# Patient Record
Sex: MALE | Race: BLACK OR AFRICAN AMERICAN | NOT HISPANIC OR LATINO | Employment: UNEMPLOYED | URBAN - METROPOLITAN AREA
[De-identification: names, ages, dates, MRNs, and addresses within clinical notes are randomized per-mention and may not be internally consistent; named-entity substitution may affect disease eponyms.]

---

## 2017-07-23 ENCOUNTER — TRANSFERRED RECORDS (OUTPATIENT)
Dept: HEALTH INFORMATION MANAGEMENT | Facility: CLINIC | Age: 47
End: 2017-07-23

## 2017-07-27 ENCOUNTER — HOSPITAL ENCOUNTER (EMERGENCY)
Facility: CLINIC | Age: 47
Discharge: HOME | End: 2017-07-27
Attending: EMERGENCY MEDICINE | Admitting: EMERGENCY MEDICINE
Payer: COMMERCIAL

## 2017-07-27 ENCOUNTER — APPOINTMENT (OUTPATIENT)
Dept: GENERAL RADIOLOGY | Facility: CLINIC | Age: 47
End: 2017-07-27
Attending: EMERGENCY MEDICINE
Payer: COMMERCIAL

## 2017-07-27 VITALS
WEIGHT: 210 LBS | RESPIRATION RATE: 18 BRPM | OXYGEN SATURATION: 98 % | TEMPERATURE: 98.7 F | SYSTOLIC BLOOD PRESSURE: 125 MMHG | HEART RATE: 86 BPM | DIASTOLIC BLOOD PRESSURE: 98 MMHG

## 2017-07-27 DIAGNOSIS — M25.511 ACUTE PAIN OF RIGHT SHOULDER: ICD-10-CM

## 2017-07-27 PROCEDURE — 73030 X-RAY EXAM OF SHOULDER: CPT | Mod: RT

## 2017-07-27 PROCEDURE — 99284 EMERGENCY DEPT VISIT MOD MDM: CPT | Mod: 25

## 2017-07-27 PROCEDURE — 99284 EMERGENCY DEPT VISIT MOD MDM: CPT | Mod: Z6 | Performed by: EMERGENCY MEDICINE

## 2017-07-27 PROCEDURE — 71020 XR CHEST 2 VW: CPT

## 2017-07-27 RX ORDER — NAPROXEN 500 MG/1
500 TABLET ORAL 2 TIMES DAILY WITH MEALS
Qty: 16 TABLET | Refills: 0 | Status: SHIPPED | OUTPATIENT
Start: 2017-07-27 | End: 2017-08-04

## 2017-07-27 RX ORDER — OXYCODONE AND ACETAMINOPHEN 5; 325 MG/1; MG/1
1-2 TABLET ORAL EVERY 4 HOURS PRN
Qty: 15 TABLET | Refills: 0 | Status: SHIPPED | OUTPATIENT
Start: 2017-07-27 | End: 2024-07-01

## 2017-07-27 ASSESSMENT — ENCOUNTER SYMPTOMS
AGITATION: 0
VOMITING: 0
WEAKNESS: 1
POLYDIPSIA: 0
LIGHT-HEADEDNESS: 0
HEADACHES: 0
SHORTNESS OF BREATH: 0
DIFFICULTY URINATING: 0
ABDOMINAL PAIN: 0
NUMBNESS: 0
NECK STIFFNESS: 0
COLOR CHANGE: 0
CHILLS: 0
NAUSEA: 0
FEVER: 0
ARTHRALGIAS: 1
NECK PAIN: 0
BRUISES/BLEEDS EASILY: 0
JOINT SWELLING: 1

## 2017-07-27 NOTE — ED AVS SNAPSHOT
[facility], Emergency Department    [address]    [place] [address]    Phone:  [phone]    Fax:  [phone]                                       Ehsan Rogers   MRN: [id number]    Department:  [facility], Emergency Department   Date of Visit:  7/27/2017           Patient Information     Date Of Birth          1970        Your diagnoses for this visit were:     Acute pain of right shoulder        You were seen by Darrin Sarmiento MD.        Discharge Instructions       Please Continue wearing her sling that you were provided several days ago during her emergency department visit. You should be hearing from [facility]opedic Clinic(phone: [phone]) in 3-5 days in regards to establishing a follow-up appointment for you.    Discharge References/Attachments     AC JOINT SPRAIN (ADULT) (ENGLISH)    SHOULDER SEPARATION, TREATMENT FOR  (ENGLISH)      24 Hour Appointment Hotline       To make an appointment at any [facility], call [phone] ([phone]). If you don't have a family doctor or clinic, we will help you find one. [facility] are conveniently located to serve the needs of you and your family.          ED Discharge Orders     ORTHO  REFERRAL       [facility] is referring you to the Orthopedic  Services at [facility].       The  Representative will assist you in the coordination of your Orthopedic and Musculoskeletal Care as prescribed by your physician.    The  Representative will call you within 1 business day to help schedule your appointment, or you may contact the  Representative at:    All areas ~ [phone]     Type of Referral : Surgical / Specialist       Timeframe requested: 3 - 5 days    Coverage of these services is subject to the terms and limitations of your health insurance plan.  Please call member services at your health plan with any benefit or coverage  questions.      If X-rays, CT or MRI's have been performed, please contact the facility where they were done to arrange for , prior to your scheduled appointment.  Please bring this referral request to your appointment and present it to your specialist.                     Review of your medicines      START taking        Dose / Directions Last dose taken    naproxen 500 MG tablet   Commonly known as:  NAPROSYN   Dose:  500 mg   Quantity:  16 tablet        Take 1 tablet (500 mg) by mouth 2 times daily (with meals) for 8 days   Refills:  0        oxyCODONE-acetaminophen 5-325 MG per tablet   Commonly known as:  PERCOCET   Dose:  1-2 tablet   Quantity:  15 tablet        Take 1-2 tablets by mouth every 4 hours as needed for pain   Refills:  0          Our records show that you are taking the medicines listed below. If these are incorrect, please call your family doctor or clinic.        Dose / Directions Last dose taken    ADVIL PO        Refills:  0        CYMBALTA PO   Dose:  20 mg        Take 20 mg by mouth daily   Refills:  0        LEXAPRO 20 MG tablet   Dose:  20 mg   Generic drug:  escitalopram        Take 20 mg by mouth daily.   Refills:  0        SEROQUEL 25 MG tablet   Dose:  25 mg   Generic drug:  QUEtiapine        Take 25 mg by mouth At Bedtime.   Refills:  0        traZODone 100 MG tablet   Commonly known as:  DESYREL   Dose:  100 mg        Take 100 mg by mouth At Bedtime.   Refills:  0        WELLBUTRIN XL PO   Dose:  150 mg        Take 150 mg by mouth daily   Refills:  0                Prescriptions were sent or printed at these locations (2 Prescriptions)                   Other Prescriptions                Printed at Department/Unit printer (2 of 2)         naproxen (NAPROSYN) 500 MG tablet               oxyCODONE-acetaminophen (PERCOCET) 5-325 MG per tablet                Procedures and tests performed during your visit     Shoulder XR, G/E 3 views, right    XR Chest 2 Views      Orders Needing  "Specimen Collection     None      Pending Results     No orders found from 2017 to 2017.            Pending Culture Results     No orders found from 2017 to 2017.            Pending Results Instructions     If you had any lab results that were not finalized at the time of your Discharge, you can call the ED Lab Result RN at [phone]. You will be contacted by this team for any positive Lab results or changes in treatment. The nurses are available 7 days a week from 10A to 6:30P.  You can leave a message 24 hours per day and they will return your call.        Thank you for choosing [facility]       Thank you for choosing [facility] for your care. Our goal is always to provide you with excellent care. Hearing back from our patients is one way we can continue to improve our services. Please take a few minutes to complete the written survey that you may receive in the mail after you visit with us. Thank you!        enosiXhart Information     [employer]lete lets you send messages to your doctor, view your test results, renew your prescriptions, schedule appointments and more. To sign up, go to [email or web address] . Click on \"Log in\" on the left side of the screen, which will take you to the Welcome page. Then click on \"Sign up Now\" on the right side of the page.     You will be asked to enter the access code listed below, as well as some personal information. Please follow the directions to create your username and password.     Your access code is: [email or web address]  Expires: 10/25/2017  7:17 PM     Your access code will  in 90 days. If you need help or a new code, please call your [facility] clinic or [phone].        Care EveryWhere ID     This is your Care EveryWhere ID. This could be used by other organizations to access your [facility] medical records  [id number]        Equal Access to Services     VINCENZO MALDONADO AH: Missy Dove, silvana rosen, dejon guerra " elda vasquez. So [facility] [phone].    ATENCIÓN: Si habla español, tiene a porter disposición servicios gratuitos de asistencia lingüística. Llame al [phone].    We comply with applicable federal civil rights laws and [place] laws. We do not discriminate on the basis of race, color, national origin, age, disability sex, sexual orientation or gender identity.            After Visit Summary       This is your record. Keep this with you and show to your community pharmacist(s) and doctor(s) at your next visit.

## 2017-07-27 NOTE — ED AVS SNAPSHOT
[facility] [place], Emergency Department    [address]    [facility] [address]    Phone:  [phone]    Fax:  [phone]                                       Ehsan Rogers   MRN: [id number]    Department:  [facility], Emergency Department   Date of Visit:  7/27/2017           After Visit Summary Signature Page     I have received my discharge instructions, and my questions have been answered. I have discussed any challenges I see with this plan with the nurse or doctor.    ..........................................................................................................................................  Patient/Patient Representative Signature      ..........................................................................................................................................  Patient Representative Print Name and Relationship to Patient    ..................................................               ................................................  Date                                            Time    ..........................................................................................................................................  Reviewed by Signature/Title    ...................................................              ..............................................  Date                                                            Time
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
## 2017-07-28 NOTE — DISCHARGE INSTRUCTIONS
Please Continue wearing her sling that you were provided several days ago during her emergency department visit. You should be hearing from [facility](phone: [phone]) in 3-5 days in regards to establishing a follow-up appointment for you.

## 2017-08-04 ENCOUNTER — PRE VISIT (OUTPATIENT)
Dept: ORTHOPEDICS | Facility: CLINIC | Age: 47
End: 2017-08-04

## 2017-08-04 NOTE — TELEPHONE ENCOUNTER
Records received from [facility].   Included:  ED notes: 7/23/17  Radiology reports: xray right shoulder 7/23/17

## 2017-08-04 NOTE — TELEPHONE ENCOUNTER
1.  Date/reason for appt: 8/14/17 2:10PM - Right Shoulder Acute Injury/ Ligament Tear  2.  Referring provider: Hosp f/u  3.  Call to patient (Yes / No - short description): yes - LVM for pt. Need LIBERTAD form [facility]  4.  Previous care at / records requested from:   - [facility] -- Pt seen in ER here, faxed request in attempt for records   - [facility] ED/Hosp 7/27/17 note in Epic, imaging in pacs

## 2017-08-14 ENCOUNTER — OFFICE VISIT (OUTPATIENT)
Dept: ORTHOPEDICS | Facility: CLINIC | Age: 47
End: 2017-08-14

## 2017-08-14 VITALS — BODY MASS INDEX: 28.02 KG/M2 | HEIGHT: 74 IN | WEIGHT: 218.3 LBS

## 2017-08-14 DIAGNOSIS — G89.29 CHRONIC SHOULDER PAIN, UNSPECIFIED LATERALITY: Primary | ICD-10-CM

## 2017-08-14 DIAGNOSIS — M25.519 CHRONIC SHOULDER PAIN, UNSPECIFIED LATERALITY: Primary | ICD-10-CM
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
## 2017-08-14 NOTE — MR AVS SNAPSHOT
"              After Visit Summary   2017    Ehsan Rogers    MRN: [id number]           Patient Information     Date Of Birth          1970        Visit Information        Provider Department      2017 2:10 PM Rod Bean MD [facility]        Today's Diagnoses     Chronic shoulder pain, unspecified laterality    -  1       Follow-ups after your visit        Who to contact     Please call your clinic at [phone] to:    Ask questions about your health    Make or cancel appointments    Discuss your medicines    Learn about your test results    Speak to your doctor   If you have compliments or concerns about an experience at your clinic, or if you wish to file a complaint, please contact [facility] Patient Relations at [phone] or email us at [email or web address]         Additional Information About Your Visit        MyChart Information     Continuent is an electronic gateway that provides easy, online access to your medical records. With Continuent, you can request a clinic appointment, read your test results, renew a prescription or communicate with your care team.     To sign up for [employer] visit the website at [email or web address]   You will be asked to enter the access code listed below, as well as some personal information. Please follow the directions to create your username and password.     Your access code is: [email or web address]  Expires: 10/25/2017  7:17 PM     Your access code will  in 90 days. If you need help or a new code, please contact your [facility] or call [phone] for assistance.        Care EveryWhere ID     This is your Care EveryWhere ID. This could be used by other organizations to access your [facility] medical records  [id number]        Your Vitals Were     Height BMI (Body Mass Index)                1.88 m (6' 2\") 28.03 kg/m2           Blood Pressure from Last 3 " Encounters:   07/27/17 (!) 125/98   05/11/12 126/86    Weight from Last 3 Encounters:   08/14/17 99 kg (218 lb 4.8 oz)   07/27/17 95.3 kg (210 lb)   05/11/12 93.4 kg (206 lb)                 Today's Medication Changes          These changes are accurate as of: 8/14/17 11:59 PM.  If you have any questions, ask your nurse or doctor.               Stop taking these medicines if you haven't already. Please contact your care team if you have questions.     SEROQUEL 25 MG tablet   Generic drug:  QUEtiapine           WELLBUTRIN XL PO                    Primary Care Provider    Physician No Ref-Primary       No address on file        Equal Access to Services     VINCENZO MALDONADO : Missy Dove, silvana rosen, karma vidales, dejon vasquez. So [facility] [phone].    ATENCIÓN: Si habla español, tiene a porter disposición servicios gratuitos de asistencia lingüística. Llame al [phone].    We comply with applicable federal civil rights laws and [place] laws. We do not discriminate on the basis of race, color, national origin, age, disability sex, sexual orientation or gender identity.            Thank you!     Thank you for choosing [facility]  for your care. Our goal is always to provide you with excellent care. Hearing back from our patients is one way we can continue to improve our services. Please take a few minutes to complete the written survey that you may receive in the mail after your visit with us. Thank you!             Your Updated Medication List - Protect others around you: Learn how to safely use, store and throw away your medicines at www.disposemymeds.org.          This list is accurate as of: 8/14/17 11:59 PM.  Always use your most recent med list.                   Brand Name Dispense Instructions for use Diagnosis    ADVIL PO           CYMBALTA PO      Take 20 mg by mouth daily        LEXAPRO 20 MG tablet   Generic drug:  escitalopram       Take 20 mg by mouth daily.        oxyCODONE-acetaminophen 5-325 MG per tablet    PERCOCET    15 tablet    Take 1-2 tablets by mouth every 4 hours as needed for pain        traZODone 100 MG tablet    DESYREL     Take 100 mg by mouth At Bedtime.
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
## 2017-08-15 ENCOUNTER — DOCUMENTATION ONLY (OUTPATIENT)
Dept: ORTHOPEDICS | Facility: CLINIC | Age: 47
End: 2017-08-15
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
## 2017-08-30 ENCOUNTER — TELEPHONE (OUTPATIENT)
Dept: ORTHOPEDICS | Facility: CLINIC | Age: 47
End: 2017-08-30

## 2017-08-30 NOTE — TELEPHONE ENCOUNTER
Bilateral shoulder MRIs done at [facility] 08/19/2017.  Message left on voicemail to call the clinic.  He should schedule a return appointment with Dr Bean to discuss results.

## 2017-09-25 ENCOUNTER — OFFICE VISIT (OUTPATIENT)
Dept: ORTHOPEDICS | Facility: CLINIC | Age: 47
End: 2017-09-25

## 2017-09-25 VITALS — WEIGHT: 215 LBS | BODY MASS INDEX: 27.59 KG/M2 | HEIGHT: 74 IN

## 2017-09-25 DIAGNOSIS — M75.52 SUBACROMIAL BURSITIS OF LEFT SHOULDER JOINT: ICD-10-CM

## 2017-09-25 DIAGNOSIS — S43.101D AC SEPARATION, RIGHT, SUBSEQUENT ENCOUNTER: Primary | ICD-10-CM

## 2017-09-25 NOTE — Clinical Note
9/25/2017       RE: Ehsan Rogers  [address] [address]     Dear Colleague,    Thank you for referring your patient, Ehsan Rogers, to the [facility] at [facility]. Please see a copy of my visit note below.    No notes on file    Again, thank you for allowing me to participate in the care of your patient.      Sincerely,    Rod Bean MD
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
## 2017-09-25 NOTE — MR AVS SNAPSHOT
After Visit Summary   2017    Ehsan Rogers    MRN: [id number]           Patient Information     Date Of Birth          1970        Visit Information        Provider Department      2017 8:40 AM Rod Bean MD [facility]        Today's Diagnoses     AC separation, right, subsequent encounter    -  1    Subacromial bursitis of left shoulder joint           Follow-ups after your visit        Additional Services     [facility] REFERRAL       Cuff, Deltoid and parascapular strengthening and range of motion.    Teach and supervise home program.    Progress as tolerated to strengthening.    Modalities PRN. Bilateral shoulder treatment.                  Who to contact     Please call your clinic at [phone] to:    Ask questions about your health    Make or cancel appointments    Discuss your medicines    Learn about your test results    Speak to your doctor   If you have compliments or concerns about an experience at your clinic, or if you wish to file a complaint, please contact [facility] Patient Relations at [phone] or email us at [email or web address]         Additional Information About Your Visit        MyChart Information     [employer] is an electronic gateway that provides easy, online access to your medical records. With [employer], you can request a clinic appointment, read your test results, renew a prescription or communicate with your care team.     To sign up for [employer] visit the website at [email or web address]   You will be asked to enter the access code listed below, as well as some personal information. Please follow the directions to create your username and password.     Your access code is: [email or web address]  Expires: 10/25/2017  7:17 PM     Your access code will  in 90 days. If you need help or a new code, please contact your [facility] or  "call [phone] for assistance.        Care EveryWhere ID     This is your Care EveryWhere ID. This could be used by other organizations to access your [facility] medical records  [id number]        Your Vitals Were     Height BMI (Body Mass Index)                1.88 m (6' 2\") 27.6 kg/m2           Blood Pressure from Last 3 Encounters:   07/27/17 (!) 125/98   05/11/12 126/86    Weight from Last 3 Encounters:   09/25/17 97.5 kg (215 lb)   08/14/17 99 kg (218 lb 4.8 oz)   07/27/17 95.3 kg (210 lb)              We Performed the Following     [facility], [facility]RACT[facility] REFERRAL        Primary Care Provider    Physician No Ref-Primary       No address on file        Equal Access to Services     VINCENZO MALDONADO : Missy Dove, waaxda luqadaha, qaybta kaalmada adearlenyaessence, dejon cloud . So [facility] [phone].    ATENCIÓN: Si habla español, tiene a porter disposición servicios gratuitos de asistencia lingüística. Llame al [phone].    We comply with applicable federal civil rights laws and [place] laws. We do not discriminate on the basis of race, color, national origin, age, disability sex, sexual orientation or gender identity.            Thank you!     Thank you for choosing [facility]  for your care. Our goal is always to provide you with excellent care. Hearing back from our patients is one way we can continue to improve our services. Please take a few minutes to complete the written survey that you may receive in the mail after your visit with us. Thank you!             Your Updated Medication List - Protect others around you: Learn how to safely use, store and throw away your medicines at www.disposemymeds.org.          This list is accurate as of: 9/25/17  9:42 AM.  Always use your most recent med list.                   Brand Name Dispense Instructions for use Diagnosis    ADVIL PO           CYMBALTA PO      Take 20 mg by mouth daily        LEXAPRO 20 " MG tablet   Generic drug:  escitalopram      Take 20 mg by mouth daily.        oxyCODONE-acetaminophen 5-325 MG per tablet    PERCOCET    15 tablet    Take 1-2 tablets by mouth every 4 hours as needed for pain        traZODone 100 MG tablet    DESYREL     Take 100 mg by mouth At Bedtime.
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
## 2017-10-30 ENCOUNTER — TELEPHONE (OUTPATIENT)
Dept: ORTHOPEDICS | Facility: CLINIC | Age: 47
End: 2017-10-30

## 2019-11-06 ENCOUNTER — HEALTH MAINTENANCE LETTER (OUTPATIENT)
Age: 49
End: 2019-11-06

## 2019-12-31 ENCOUNTER — COMMUNICATION - HEALTHEAST (OUTPATIENT)
Dept: TELEHEALTH | Facility: CLINIC | Age: 49
End: 2019-12-31

## 2019-12-31 ENCOUNTER — OFFICE VISIT - HEALTHEAST (OUTPATIENT)
Dept: FAMILY MEDICINE | Facility: CLINIC | Age: 49
End: 2019-12-31

## 2019-12-31 DIAGNOSIS — Z00.00 ROUTINE ADULT HEALTH MAINTENANCE: ICD-10-CM

## 2019-12-31 LAB
CHOLEST SERPL-MCNC: 197 MG/DL
FASTING STATUS PATIENT QL REPORTED: NO
HDLC SERPL-MCNC: 48 MG/DL
LDLC SERPL CALC-MCNC: 125 MG/DL
PSA SERPL-MCNC: 0.2 NG/ML (ref 0–3.5)
TRIGL SERPL-MCNC: 120 MG/DL
TSH SERPL DL<=0.005 MIU/L-ACNC: 1.85 UIU/ML (ref 0.3–5)

## 2019-12-31 ASSESSMENT — MIFFLIN-ST. JEOR: SCORE: 1944.32

## 2019-12-31 ASSESSMENT — PATIENT HEALTH QUESTIONNAIRE - PHQ9: SUM OF ALL RESPONSES TO PHQ QUESTIONS 1-9: 14

## 2020-01-01 LAB
HBV SURFACE AG SERPL QL IA: NEGATIVE
HCV AB SERPL QL IA: NEGATIVE
HEPATITIS B SURFACE ANTIBODY LHE- HISTORICAL: ABNORMAL

## 2020-01-02 ENCOUNTER — COMMUNICATION - HEALTHEAST (OUTPATIENT)
Dept: FAMILY MEDICINE | Facility: CLINIC | Age: 50
End: 2020-01-02

## 2020-11-29 ENCOUNTER — HEALTH MAINTENANCE LETTER (OUTPATIENT)
Age: 50
End: 2020-11-29

## 2021-05-25 ENCOUNTER — RECORDS - HEALTHEAST (OUTPATIENT)
Dept: ADMINISTRATIVE | Facility: CLINIC | Age: 51
End: 2021-05-25

## 2021-05-26 ASSESSMENT — PATIENT HEALTH QUESTIONNAIRE - PHQ9: SUM OF ALL RESPONSES TO PHQ QUESTIONS 1-9: 14

## 2021-06-04 VITALS
DIASTOLIC BLOOD PRESSURE: 68 MMHG | WEIGHT: 229.38 LBS | OXYGEN SATURATION: 94 % | HEART RATE: 71 BPM | SYSTOLIC BLOOD PRESSURE: 100 MMHG | HEIGHT: 73 IN | RESPIRATION RATE: 16 BRPM | BODY MASS INDEX: 30.4 KG/M2

## 2021-06-04 NOTE — PROGRESS NOTES
Please inform patient that his laboratory results look good.  His immunization for hepatitis B may not be completely in effect at this point.  It may have worn off.  If he would like to repeat the vaccination at some point we would be happy to do so.
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
## 2021-06-20 NOTE — LETTER
Letter by Linnea Hernandez MD at      Author: Linnea Hernandez MD Service: -- Author Type: --    Filed:  Encounter Date: 1/2/2020 Status: Signed         Ehsan Rogers  [address] [address]            January 2, 2020        Dear Mr. Rogers,        Below are the results from your recent visit:    Resulted Orders   Thyroid Stimulating Hormone (TSH)   Result Value Ref Range    TSH 1.85 0.30 - 5.00 uIU/mL   PSA (Prostatic-Specific Antigen), Annual Screen   Result Value Ref Range    PSA 0.2 0.0 - 3.5 ng/mL    Narrative    Method is Abbott Prostate-Specific Antigen (PSA)  Standard-WHO 1st International (90:10)   Lipid [place]   Result Value Ref Range    Cholesterol 197 <=199 mg/dL    Triglycerides 120 <=149 mg/dL    HDL Cholesterol 48 >=40 mg/dL    LDL Calculated 125 <=129 mg/dL    Patient Fasting > 8hrs? No    Hepatitis C Antibody (Anti-HCV)   Result Value Ref Range    Hepatitis C Ab Negative Negative   Hepatitis B Surface Antibody (Anti-HBs)   Result Value Ref Range    Hep B Surface Antibody Equivocal, suggest repeat (!) Negative   Hepatitis B Surface Antigen (HBsAG)   Result Value Ref Range    Hepatitis B Surface Ag Negative Negative         Ehsan, your laboratory results look good.  The hepatitis screen shows that you may or may not be immune to hepatitis B.  I assume that you most likely had a vaccination for hepatitis B in the past.  It would be wise to consider repeat vaccination sometime in the future.    Sincerely,        HILDA Hernandez MD, PATRICK  [facility]  [phone]

## 2021-07-21 ENCOUNTER — OFFICE VISIT (OUTPATIENT)
Dept: FAMILY MEDICINE | Facility: CLINIC | Age: 51
End: 2021-07-21
Payer: COMMERCIAL

## 2021-07-21 VITALS
BODY MASS INDEX: 30.61 KG/M2 | SYSTOLIC BLOOD PRESSURE: 120 MMHG | DIASTOLIC BLOOD PRESSURE: 85 MMHG | RESPIRATION RATE: 14 BRPM | OXYGEN SATURATION: 99 % | HEART RATE: 78 BPM | TEMPERATURE: 98.7 F | WEIGHT: 232 LBS

## 2021-07-21 DIAGNOSIS — K62.5 BRIGHT RED BLOOD PER RECTUM: ICD-10-CM

## 2021-07-21 DIAGNOSIS — K21.9 GASTROESOPHAGEAL REFLUX DISEASE, UNSPECIFIED WHETHER ESOPHAGITIS PRESENT: Primary | ICD-10-CM

## 2021-07-21 LAB
BASOPHILS # BLD AUTO: 0 10E3/UL (ref 0–0.2)
BASOPHILS NFR BLD AUTO: 0 %
EOSINOPHIL # BLD AUTO: 0.2 10E3/UL (ref 0–0.7)
EOSINOPHIL NFR BLD AUTO: 2 %
ERYTHROCYTE [DISTWIDTH] IN BLOOD BY AUTOMATED COUNT: 12.6 % (ref 10–15)
HCT VFR BLD AUTO: 42.4 % (ref 40–53)
HGB BLD-MCNC: 13.9 G/DL (ref 13.3–17.7)
IMM GRANULOCYTES # BLD: 0 10E3/UL
IMM GRANULOCYTES NFR BLD: 0 %
LYMPHOCYTES # BLD AUTO: 2.6 10E3/UL (ref 0.8–5.3)
LYMPHOCYTES NFR BLD AUTO: 33 %
MCH RBC QN AUTO: 29.7 PG (ref 26.5–33)
MCHC RBC AUTO-ENTMCNC: 32.8 G/DL (ref 31.5–36.5)
MCV RBC AUTO: 91 FL (ref 78–100)
MONOCYTES # BLD AUTO: 0.8 10E3/UL (ref 0–1.3)
MONOCYTES NFR BLD AUTO: 10 %
NEUTROPHILS # BLD AUTO: 4.3 10E3/UL (ref 1.6–8.3)
NEUTROPHILS NFR BLD AUTO: 54 %
PLATELET # BLD AUTO: 253 10E3/UL (ref 150–450)
RBC # BLD AUTO: 4.68 10E6/UL (ref 4.4–5.9)
WBC # BLD AUTO: 7.9 10E3/UL (ref 4–11)

## 2021-07-21 PROCEDURE — 99213 OFFICE O/P EST LOW 20 MIN: CPT | Performed by: FAMILY MEDICINE

## 2021-07-21 PROCEDURE — 85025 COMPLETE CBC W/AUTO DIFF WBC: CPT | Performed by: FAMILY MEDICINE

## 2021-07-21 PROCEDURE — 36415 COLL VENOUS BLD VENIPUNCTURE: CPT | Performed by: FAMILY MEDICINE

## 2021-07-21 RX ORDER — OMEPRAZOLE 40 MG/1
40 CAPSULE, DELAYED RELEASE ORAL DAILY
Qty: 30 CAPSULE | Refills: 1 | Status: SHIPPED | OUTPATIENT
Start: 2021-07-21 | End: 2024-07-01

## 2021-07-21 RX ORDER — CALCIUM CARBONATE 500 MG/1
1 TABLET, CHEWABLE ORAL 2 TIMES DAILY
COMMUNITY
End: 2024-07-01

## 2021-07-21 NOTE — PATIENT INSTRUCTIONS
Patient Education     Gastritis (Adult)    Gastritis is inflammation and irritation of the stomach lining. You can have it for a short time (acute) or it can be long lasting (chronic). Infection with bacteria called H. pylori most often causes gastritis. More than 1 out of 3 people in the [place] have these bacteria in their bodies. In many cases, H. pylori causes no problems or symptoms. But in some people, the infection irritates the stomach lining and causes gastritis. H. pylori may be diagnosed through blood, stool, or breath tests, or by a biopsy during an endoscopy. Other causes of stomach irritation include drinking alcohol, smoking or chewing tobacco, or taking pain-relieving medicines called nonsteroidal anti-inflammatory drugs (NSAIDs), such as aspirin or ibuprofen. Some illegal drugs (such as cocaine) and immune conditions can also cause gastritis.   Symptoms of gastritis can include:    Belly pain or bloating    Feeling full quickly    Loss of appetite    Nausea or vomiting    Vomiting blood or having black stools    Feeling more tired than normal  An inflamed and irritated stomach lining is more likely to develop a sore called an ulcer. To help prevent this, gastritis should be evaluated and treated as soon as symptoms occur.   Home care  If needed, your healthcare provider may prescribe medicines. If you have H. pylori infection, treating it will likely ease your symptoms. Other changes can help reduce stomach irritation and help it heal.     Take prescription medicines as directed. If you have been prescribed medicines for H. pylori infection, take them as directed. Take all of the medicine until it's finished or until your provider tells you to stop taking it, even if you start to feel better.    Follow your healthcare provider's advice on NSAIDs. Your provider may advise you not to take NSAIDs. If you take daily aspirin for your heart or other health reasons, don't stop without talking with your  provider first.    Don't drink alcohol. If you need help stopping your use of alcohol, ask your provider for treatment resources.    Stop smoking. Smoking can irritate the stomach and delay healing. As much as possible, stay away from secondhand smoke. If you smoke and have trouble stopping, ask your provider for help.  Follow-up care  Follow up with your healthcare provider, or as advised. You may need testing to check for inflammation or an ulcer.   When to get medical advice  Call your healthcare provider for any of the following:    Stomach pain that gets worse or moves to the lower right belly (appendix area)    Chest pain that suddenly appears or gets worse, or spreads to the back, neck, shoulder, or arm    Frequent vomiting (can t keep down liquids)    Blood in the stool or vomit (red or black in color)    Feeling weak or dizzy    Shortness of breath    Unexplained weight loss    Fever of 100.4 F (38 C) or higher, or as directed by your healthcare provider    Symptoms that get worse, or new symptoms  [employer] last reviewed this educational content on 2/1/2021 2000-2021 [employer]. All rights reserved. This information is not intended as a substitute for professional medical care. Always follow your healthcare professional's instructions.         Patient Education     Hemorrhoids    Hemorrhoids are swollen and inflamed veins inside the rectum and near the anus. The rectum is the last several inches of the colon. The anus is the passage between the rectum and the outside of the body.   Causes  The veins can become swollen due to increased pressure in them. This is most often caused by:     Chronic constipation or diarrhea    Straining when having a bowel movement    Sitting too long on the toilet    A low-fiber diet    Pregnancy  Symptoms    Bleeding from the rectum. You may notice this after bowel movements.    Lump near the anus    Itching around the anus    Pain around the anus    Mucus  leaks from the anus  There are different types of hemorrhoids. Depending on the type you have and the severity, you may be able to treat yourself at home. In some cases, a procedure may be the best treatment option. Your healthcare provider can tell you more about this, if needed.   Home care  General care    To get relief from pain or itching, try:  ? Medicines. Your healthcare provider may recommend stool softeners, suppositories, or laxatives to help manage constipation. Use these exactly as directed.  ? Sitz baths. A sitz bath involves sitting in a few inches of warm bath water. Be careful not to make the water so hot that you burn yourself--test it before sitting in it. Soak for about 10 to 15 minutes a few times a day. This may help relieve pain.  ? Topical products. Your healthcare provider may prescribe or recommend creams, ointments, or pads that can be applied to the hemorrhoid. Use these exactly as directed.  Tips to help prevent hemorrhoids     Eat more fiber. Fiber adds bulk to stool and absorbs water as it moves through your colon. This makes stool softer and easier to pass.  ? Increase the fiber in your diet with more fiber-rich foods. These include fresh fruit, vegetables, and whole grains.  ? Take a fiber supplement or bulking agent, if advised by your healthcare provider. These include products such as psyllium or methylcellulose.    Drink more water. Your healthcare provider may direct you to drink plenty of water. This can help keep stool soft.    Be more active. Frequent exercise aids digestion and helps prevent constipation. It may also help make bowel movements more regular.    Don t strain during bowel movements. This can make hemorrhoids more likely. Also, don t sit on the toilet for long periods of time.    Follow-up care  Follow up with your healthcare provider as advised. If a culture or imaging tests were done, someone will let you know the results when they are ready. This may take a  few days or longer. If your healthcare provider recommends a procedure for your hemorrhoids, these options can be discussed. Options may include surgery and outpatient office treatments.   When to seek medical advice  Call your healthcare provider right away if any of these occur:     Increased bleeding from the rectum    Increased pain around the rectum or anus    Weakness or dizziness  Call 911  Call 911 if any of these occur:     Trouble breathing or swallowing    Fainting or loss of consciousness    Unusually fast heart rate    Vomiting blood    Large amounts of blood in stool or black, tarry stools  [employer] last reviewed this educational content on 8/1/2019 2000-2021 [employer]. All rights reserved. This information is not intended as a substitute for professional medical care. Always follow your healthcare professional's instructions.

## 2021-07-21 NOTE — PROGRESS NOTES
Clinical Decision Making:    At the end of the encounter, I discussed results, diagnosis, medications. Discussed red flags for immediate return to clinic/ER, as well as indications for follow up if no improvement. Patient understood and agreed to plan. Patient was stable for discharge.      ICD-10-CM    1. Gastroesophageal reflux disease, unspecified whether esophagitis present  K21.9 HPYLORI-CLOTEST     omeprazole (PRILOSEC) 40 MG DR capsule   2. Bright red blood per rectum  K62.5 CBC with platelets and differential     CBC with platelets and differential         CBC is within normal limits. We will treat his reflux with Prilosec 40 mg daily. Recommended stopping drinking coffee.  Recommended Preparation H, Anusol, or other over-the-counter hemorrhoid treatment.  H. pylori test pending  Follow-up with primary doctor within a week for recheck. Follow-up sooner if worsening symptoms.    Printed patient education for gastritis and hemorrhoids  Return in about 1 week (around 7/28/2021) for with primary provider.      chief complaint    HPI:  Ehsan Rogers is a 51 year old male who presents today complaining of bright red blood per rectum for a couple of days. He had noticed blood with wiping for 1 to 2 days but then now it was in the toilet bowl so. No black stools or tarry stools.  His appetite is normal. No abdominal pain. No lightheadedness or dizziness.  He has been dealing with acid reflux for 6 to 7 months. He was in a natural plant-based program and was prescribed some sort of tree bark that helped for the couple weeks that he was there. He is no longer using that now. In the last month his acid reflux symptoms have gotten worse. Coffee and warm water with lemon particularly irritate it. He has been needing to take a Tums 6-8 times per day.  He reports a history of alcohol abuse although he is currently sober. He is concerned about esophageal varices.  He does not take a proton pump inhibitor or any other acid  reflux treatment currently besides the Tums.    History obtained from the patient.    Problem List:  2017-09: AC separation, right, subsequent encounter  2017-09: Subacromial bursitis of left shoulder joint      No past medical history on file.    Social History     Tobacco Use     Smoking status: Never Smoker     Smokeless tobacco: Never Used   Substance Use Topics     Alcohol use: Yes     Comment: drinks approx. 300-700 ml vodka daily       Review of systems  negative except listed in HPI      Vitals:    07/21/21 1717   BP: 120/85   Pulse: 78   Resp: 14   Temp: 98.7  F (37.1  C)   SpO2: 99%   Weight: 105.2 kg (232 lb)       Physical Exam  Vitals noted and within normal limits  In general he is alert, oriented, and in no acute distress  Heart has a regular rate and rhythm with no murmurs  Lungs are clear to auscultation bilaterally  Abdomen has normal bowel sounds, soft, nondistended.  Mild tenderness to palpation of the epigastric area.  Anal exam with evidence of hemorrhoid tissue.  CBC within normal limits    Recent Results (from the past 168 hour(s))   CBC with platelets and differential   Result Value Ref Range Status    WBC Count 7.9 4.0 - 11.0 10e3/uL Final    RBC Count 4.68 4.40 - 5.90 10e6/uL Final    Hemoglobin 13.9 13.3 - 17.7 g/dL Final    Hematocrit 42.4 40.0 - 53.0 % Final    MCV 91 78 - 100 fL Final    MCH 29.7 26.5 - 33.0 pg Final    MCHC 32.8 31.5 - 36.5 g/dL Final    RDW 12.6 10.0 - 15.0 % Final    Platelet Count 253 150 - 450 10e3/uL Final    % Neutrophils 54 % Final    % Lymphocytes 33 % Final    % Monocytes 10 % Final    % Eosinophils 2 % Final    % Basophils 0 % Final    % Immature Granulocytes 0 % Final    Absolute Neutrophils 4.3 1.6 - 8.3 10e3/uL Final    Absolute Lymphocytes 2.6 0.8 - 5.3 10e3/uL Final    Absolute Monocytes 0.8 0.0 - 1.3 10e3/uL Final    Absolute Eosinophils 0.2 0.0 - 0.7 10e3/uL Final    Absolute Basophils 0.0 0.0 - 0.2 10e3/uL Final    Absolute Immature Granulocytes 0.0  <=0.0 10e3/uL Final     *Note: Due to a large number of results and/or encounters for the requested time period, some results have not been displayed. A complete set of results can be found in Results Review.

## 2021-07-22 ENCOUNTER — LAB (OUTPATIENT)
Dept: LAB | Facility: CLINIC | Age: 51
End: 2021-07-22
Payer: COMMERCIAL

## 2021-07-22 DIAGNOSIS — K21.9 GASTROESOPHAGEAL REFLUX DISEASE WITHOUT ESOPHAGITIS: Primary | ICD-10-CM

## 2021-07-22 PROCEDURE — 87338 HPYLORI STOOL AG IA: CPT

## 2021-07-25 ENCOUNTER — HEALTH MAINTENANCE LETTER (OUTPATIENT)
Age: 51
End: 2021-07-25

## 2021-07-27 LAB — H PYLORI AG STL QL IA: NEGATIVE

## 2021-09-19 ENCOUNTER — HEALTH MAINTENANCE LETTER (OUTPATIENT)
Age: 51
End: 2021-09-19

## 2022-02-11 ENCOUNTER — HOSPITAL ENCOUNTER (EMERGENCY)
Facility: CLINIC | Age: 52
Discharge: HOME | End: 2022-02-11
Admitting: NURSE PRACTITIONER
Payer: COMMERCIAL

## 2022-02-11 ENCOUNTER — NURSE TRIAGE (OUTPATIENT)
Dept: NURSING | Facility: CLINIC | Age: 52
End: 2022-02-11

## 2022-02-11 ENCOUNTER — OFFICE VISIT (OUTPATIENT)
Dept: FAMILY MEDICINE | Facility: CLINIC | Age: 52
End: 2022-02-11
Payer: COMMERCIAL

## 2022-02-11 VITALS
RESPIRATION RATE: 16 BRPM | HEART RATE: 88 BPM | TEMPERATURE: 97.7 F | DIASTOLIC BLOOD PRESSURE: 85 MMHG | SYSTOLIC BLOOD PRESSURE: 124 MMHG | OXYGEN SATURATION: 99 % | BODY MASS INDEX: 31.27 KG/M2 | WEIGHT: 237 LBS

## 2022-02-11 VITALS
OXYGEN SATURATION: 98 % | WEIGHT: 237 LBS | DIASTOLIC BLOOD PRESSURE: 81 MMHG | HEART RATE: 74 BPM | BODY MASS INDEX: 31.41 KG/M2 | HEIGHT: 73 IN | TEMPERATURE: 98.3 F | RESPIRATION RATE: 16 BRPM | SYSTOLIC BLOOD PRESSURE: 145 MMHG

## 2022-02-11 DIAGNOSIS — R73.09 ABNORMAL BLOOD SUGAR: ICD-10-CM

## 2022-02-11 DIAGNOSIS — Z13.9 SCREENING FOR CONDITION: Primary | ICD-10-CM

## 2022-02-11 DIAGNOSIS — R34 OLIGOURIA: ICD-10-CM

## 2022-02-11 DIAGNOSIS — R10.9 FLANK PAIN: ICD-10-CM

## 2022-02-11 LAB
ALBUMIN UR-MCNC: NEGATIVE MG/DL
ANION GAP SERPL CALCULATED.3IONS-SCNC: 8 MMOL/L (ref 5–18)
APPEARANCE UR: CLEAR
BASOPHILS # BLD AUTO: 0 10E3/UL (ref 0–0.2)
BASOPHILS NFR BLD AUTO: 1 %
BILIRUB UR QL STRIP: NEGATIVE
BUN SERPL-MCNC: 9 MG/DL (ref 8–22)
CALCIUM SERPL-MCNC: 9.1 MG/DL (ref 8.5–10.5)
CHLORIDE BLD-SCNC: 106 MMOL/L (ref 98–107)
CO2 SERPL-SCNC: 26 MMOL/L (ref 22–31)
COLOR UR AUTO: YELLOW
CREAT SERPL-MCNC: 1.02 MG/DL (ref 0.7–1.3)
EOSINOPHIL # BLD AUTO: 0.1 10E3/UL (ref 0–0.7)
EOSINOPHIL NFR BLD AUTO: 2 %
ERYTHROCYTE [DISTWIDTH] IN BLOOD BY AUTOMATED COUNT: 12.7 % (ref 10–15)
GFR SERPL CREATININE-BSD FRML MDRD: 88 ML/MIN/1.73M2
GLUCOSE BLD-MCNC: 58 MG/DL (ref 70–125)
GLUCOSE BLDC GLUCOMTR-MCNC: 112 MG/DL (ref 70–99)
GLUCOSE UR STRIP-MCNC: NEGATIVE MG/DL
HBA1C MFR BLD: 5.3 % (ref 0–5.6)
HCT VFR BLD AUTO: 42.3 % (ref 40–53)
HGB BLD-MCNC: 14.4 G/DL (ref 13.3–17.7)
HGB UR QL STRIP: NEGATIVE
IMM GRANULOCYTES # BLD: 0 10E3/UL
IMM GRANULOCYTES NFR BLD: 0 %
KETONES UR STRIP-MCNC: NEGATIVE MG/DL
LEUKOCYTE ESTERASE UR QL STRIP: NEGATIVE
LYMPHOCYTES # BLD AUTO: 2.3 10E3/UL (ref 0.8–5.3)
LYMPHOCYTES NFR BLD AUTO: 35 %
MCH RBC QN AUTO: 31.3 PG (ref 26.5–33)
MCHC RBC AUTO-ENTMCNC: 34 G/DL (ref 31.5–36.5)
MCV RBC AUTO: 92 FL (ref 78–100)
MONOCYTES # BLD AUTO: 0.7 10E3/UL (ref 0–1.3)
MONOCYTES NFR BLD AUTO: 10 %
NEUTROPHILS # BLD AUTO: 3.5 10E3/UL (ref 1.6–8.3)
NEUTROPHILS NFR BLD AUTO: 53 %
NITRATE UR QL: NEGATIVE
PH UR STRIP: 6.5 [PH] (ref 5–8)
PLATELET # BLD AUTO: 241 10E3/UL (ref 150–450)
POTASSIUM BLD-SCNC: 4.3 MMOL/L (ref 3.5–5)
RBC # BLD AUTO: 4.6 10E6/UL (ref 4.4–5.9)
SODIUM SERPL-SCNC: 140 MMOL/L (ref 136–145)
SP GR UR STRIP: 1.02 (ref 1–1.03)
UROBILINOGEN UR STRIP-ACNC: 0.2 E.U./DL
WBC # BLD AUTO: 6.6 10E3/UL (ref 4–11)

## 2022-02-11 PROCEDURE — 36415 COLL VENOUS BLD VENIPUNCTURE: CPT | Performed by: PHYSICIAN ASSISTANT

## 2022-02-11 PROCEDURE — 99213 OFFICE O/P EST LOW 20 MIN: CPT | Performed by: PHYSICIAN ASSISTANT

## 2022-02-11 PROCEDURE — 83036 HEMOGLOBIN GLYCOSYLATED A1C: CPT | Performed by: PHYSICIAN ASSISTANT

## 2022-02-11 PROCEDURE — 85025 COMPLETE CBC W/AUTO DIFF WBC: CPT | Performed by: PHYSICIAN ASSISTANT

## 2022-02-11 PROCEDURE — 80048 BASIC METABOLIC PNL TOTAL CA: CPT | Performed by: PHYSICIAN ASSISTANT

## 2022-02-11 PROCEDURE — 81003 URINALYSIS AUTO W/O SCOPE: CPT | Performed by: PHYSICIAN ASSISTANT

## 2022-02-11 PROCEDURE — 99283 EMERGENCY DEPT VISIT LOW MDM: CPT

## 2022-02-11 ASSESSMENT — MIFFLIN-ST. JEOR: SCORE: 1978.9

## 2022-02-11 ASSESSMENT — ENCOUNTER SYMPTOMS
DIAPHORESIS: 0
POLYDIPSIA: 0
WEAKNESS: 0
CONFUSION: 0

## 2022-02-11 NOTE — TELEPHONE ENCOUNTER
" Melinda calling in critical glucose of 58 drawn at 3:45 pm today.     Writer contacted pt who reports he had \"kidney pain at the time\". States he ate at 1:00 today, meat, sweet potatoes and spinach. Pt states he did not have any other acute symptoms at time of lab draw or otherwise. Pt sounds calm to Writer and in no acute distress. Pt reports he was upset with the doctors manner towards him during visit.     Writer advised pt he can contact Patient Relations and pt states he does have the phone number. Advised pt message will be sent to ordering provider to f/u and he can also schedule with a PCP as advised by ordering provider. Advised pt he can review all [facility] on [facility]. Advised pt to call back if any questions or concerns.     Pt verbalizes understanding and agrees to plan.     Reason for Disposition    Lab result questions    Lab or radiology calling with CRITICAL test results    Protocols used: INFORMATION ONLY CALL - NO TRIAGE-A-AH, PCP CALL - NO TRIAGE-A-AH      "

## 2022-02-11 NOTE — PROGRESS NOTES
"Patient presents with:  Back Pain: back pain x 1 week.      Clinical Decision Making:  Patient presented to clinic for evaluation of foamy urine and bilateral flank pain.  Patient did not have  urinary symptoms and no gross hematuria and no fever chills or night sweats; no history of nephrolithiasis.  CBC, basic metabolic panel and urinalysis were collected.  Urinalysis did not show infection or glucose in the urine. I advised the patient that the urine test is not a \"good screening test\" for diabetes.  Patient does not have any primary care provider and declined referral to primary care.  After the results of the labs were reviewed I told him that the basic metabolic panel was pending.  Patient requested a \"diabetes test\" and a cholesterol test.  I advised him that the cholesterol test is a fasting test and was not able to be performed out of the urgent care today and it is not routinely ordered.  Patient requested a diabetes screening test because he has a history of diabetes in the family with father and grandfather.  A hemoglobin A1c test was placed as an add on order today in the office.  Referral to primary care to establish care and go over his concern for flank pain and healthcare screenings was written. Patient voiced his unhappiness that the screening lab for diabetes was not performed with the first order.  However, I tried to point out to the patient that flank pain is not a routine indicator for diabetes screening.  However, I also pointed out that he did not request it initially and once he requested it I added the screening test on as an add-on lab.  Patient voiced his displeasure at having to request a screening diabetes lab and requested to have the clinic manager's contact information.  Advised him that I did not know the clinic managers information since she has recently left the position, and I did not know who the replacement was at this time.  I sent our nursing staff into the office to help " the patient with his request.       ICD-10-CM    1. Screening for condition  Z13.9 Hemoglobin A1c     CANCELED: Hemoglobin A1c     CANCELED: Hemoglobin A1c   2. Flank pain  R10.9 UA macro with reflex to Microscopic and Culture - Clinc Collect     CBC with platelets and differential     Basic metabolic panel     Primary Care Referral     Hemoglobin A1c     CANCELED: Hemoglobin A1c     CANCELED: Hemoglobin A1c   3. Oligouria  R34 UA macro with reflex to Microscopic and Culture - Clinc Collect     CBC with platelets and differential     Basic metabolic panel     Primary Care Referral       Patient Instructions         Patient Education     Flank Pain with Uncertain Cause    The flank is the area between your upper belly (abdomen) and your back. Pain there is often caused by a problem with your kidneys. It might be a kidney infection or a kidney stone. Other causes of flank pain include spinal arthritis, a pinched nerve from a back injury, a rib injury, a bruise, a back muscle strain, inflammation, or spasm.  The cause of your flank pain is not certain. You may need other tests.  Home care  Follow these tips when caring for yourself at home:    You may use acetaminophen or ibuprofen to control pain, unless your healthcare provider prescribed another medicine. If you have chronic liver or kidney disease, talk with your provider before taking these medicines. Also talk with your provider first if you ve ever had a stomach ulcer or digestive bleeding.    If the pain is coming from your muscles, you may get relief with ice or heat. During the first 2 days after the injury, put an ice pack on the painful area for 20 minutes every 2 to 4 hours. This will reduce swelling and pain. A hot shower, hot bath, or heating pad works well for a muscle spasm. You can start with ice, then switch to heat after 2 days. You might find that alternating ice and heat works well. Use the method that feels the best to you.  Follow-up  "care  Follow up with your healthcare provider if your symptoms don t get better over the next few days.  When to seek medical advice  Call your healthcare provider right away if any of these happen:    Repeated vomiting    Fever of 100.4 F (38 C) or higher, or as directed by your healthcare provider    Flank pain that gets worse    Pain that spreads to the front of your belly (abdomen)    Dizziness, weakness, or fainting    Blood in your urine    Burning feeling when you urinate or the need to urinate often    Pain in one of your legs that gets worse    Numbness or weakness in a leg  [employer] last reviewed this educational content on 10/1/2019    6668-5267 [employer]. All rights reserved. This information is not intended as a substitute for professional medical care. Always follow your healthcare professional's instructions.               HPI:  Ehsan Rogers is a 51 year old male who presents today for bilateral flank pain that is low-grade and associated with decreased urine output and \"foamy\" urine.  Patient did not admit to having urinary symptoms to include urinary hesitancy urgency dysuria gross hematuria or pneumaturia or suprapubic tenderness or pain or induration or urinary retention.  He did state that he felt that he was going more frequently and in smaller amounts.  He has had no previous history of nephrolithiasis.  His urinary stream was described as normal force and only mild dribbling at the end of the urination.  Patient does not admit to any abdominal pain or distention.  He has not had fever chills night sweats fatigue or other constitutional symptoms vomiting or diarrhea to report.    History obtained from chart review and the patient.    Problem List:  2017-09: AC separation, right, subsequent encounter  2017-09: Subacromial bursitis of left shoulder joint      History reviewed. No pertinent past medical history.    Social History     Tobacco Use     Smoking status: Never Smoker "     Smokeless tobacco: Never Used   Substance Use Topics     Alcohol use: Yes     Comment: drinks approx. 300-700 ml vodka daily       Review of Systems  As above in HPI otherwise negative.    Vitals:    02/11/22 1436   BP: 124/85   Pulse: 88   Resp: 16   Temp: 97.7  F (36.5  C)   TempSrc: Oral   SpO2: 99%   Weight: 107.5 kg (237 lb)       General: Patient is resting comfortably no acute distress is afebrile  HEENT: Head is normocephalic atraumatic   eyes are PERRL EOMI sclera anicteric   TMs are clear bilaterally  Throat is with mild pharyngeal wall erythema and no exudate  No cervical lymphadenopathy present  LUNGS: Clear to auscultation bilaterally  HEART: Regular rate and rhythm  Abdomen: Non-tender, non-distended no rebound or guarding no masses.  No suprapubic tenderness to palpation and no induration.  Skin: Without rash non-diaphoretic    Physical Exam      Labs:  Results for orders placed or performed in visit on 02/11/22   UA macro with reflex to Microscopic and Culture - Clinc Collect     Status: Normal    Specimen: Urine, Clean Catch   Result Value Ref Range    Color Urine Yellow Colorless, Straw, Light Yellow, Yellow    Appearance Urine Clear Clear    Glucose Urine Negative Negative mg/dL    Bilirubin Urine Negative Negative    Ketones Urine Negative Negative mg/dL    Specific Gravity Urine 1.025 1.005 - 1.030    Blood Urine Negative Negative    pH Urine 6.5 5.0 - 8.0    Protein Albumin Urine Negative Negative mg/dL    Urobilinogen Urine 0.2 0.2, 1.0 E.U./dL    Nitrite Urine Negative Negative    Leukocyte Esterase Urine Negative Negative    Narrative    Microscopic not indicated   CBC with platelets and differential     Status: None   Result Value Ref Range    WBC Count 6.6 4.0 - 11.0 10e3/uL    RBC Count 4.60 4.40 - 5.90 10e6/uL    Hemoglobin 14.4 13.3 - 17.7 g/dL    Hematocrit 42.3 40.0 - 53.0 %    MCV 92 78 - 100 fL    MCH 31.3 26.5 - 33.0 pg    MCHC 34.0 31.5 - 36.5 g/dL    RDW 12.7 10.0 - 15.0 %     Platelet Count 241 150 - 450 10e3/uL    % Neutrophils 53 %    % Lymphocytes 35 %    % Monocytes 10 %    % Eosinophils 2 %    % Basophils 1 %    % Immature Granulocytes 0 %    Absolute Neutrophils 3.5 1.6 - 8.3 10e3/uL    Absolute Lymphocytes 2.3 0.8 - 5.3 10e3/uL    Absolute Monocytes 0.7 0.0 - 1.3 10e3/uL    Absolute Eosinophils 0.1 0.0 - 0.7 10e3/uL    Absolute Basophils 0.0 0.0 - 0.2 10e3/uL    Absolute Immature Granulocytes 0.0 <=0.4 10e3/uL   CBC with platelets and differential     Status: None    Narrative    The following orders were created for panel order CBC with platelets and differential.  Procedure                               Abnormality         Status                     ---------                               -----------         ------                     CBC with platelets and d...[[id number]]                      Final result                 Please view results for these tests on the individual orders.     Basic metabolic panel is pending.    At the end of the encounter, I discussed results, diagnosis, medications. Discussed red flags for immediate return to clinic/ER, as well as indications for follow up if no improvement. Patient understood and agreed to plan. Patient was stable for discharge.

## 2022-02-11 NOTE — PATIENT INSTRUCTIONS
Patient Education     Flank Pain with Uncertain Cause    The flank is the area between your upper belly (abdomen) and your back. Pain there is often caused by a problem with your kidneys. It might be a kidney infection or a kidney stone. Other causes of flank pain include spinal arthritis, a pinched nerve from a back injury, a rib injury, a bruise, a back muscle strain, inflammation, or spasm.  The cause of your flank pain is not certain. You may need other tests.  Home care  Follow these tips when caring for yourself at home:    You may use acetaminophen or ibuprofen to control pain, unless your healthcare provider prescribed another medicine. If you have chronic liver or kidney disease, talk with your provider before taking these medicines. Also talk with your provider first if you ve ever had a stomach ulcer or digestive bleeding.    If the pain is coming from your muscles, you may get relief with ice or heat. During the first 2 days after the injury, put an ice pack on the painful area for 20 minutes every 2 to 4 hours. This will reduce swelling and pain. A hot shower, hot bath, or heating pad works well for a muscle spasm. You can start with ice, then switch to heat after 2 days. You might find that alternating ice and heat works well. Use the method that feels the best to you.  Follow-up care  Follow up with your healthcare provider if your symptoms don t get better over the next few days.  When to seek medical advice  Call your healthcare provider right away if any of these happen:    Repeated vomiting    Fever of 100.4 F (38 C) or higher, or as directed by your healthcare provider    Flank pain that gets worse    Pain that spreads to the front of your belly (abdomen)    Dizziness, weakness, or fainting    Blood in your urine    Burning feeling when you urinate or the need to urinate often    Pain in one of your legs that gets worse    Numbness or weakness in a leg  [employer] last reviewed this  educational content on 10/1/2019    0707-8375 [employer]. All rights reserved. This information is not intended as a substitute for professional medical care. Always follow your healthcare professional's instructions.

## 2022-02-11 NOTE — ED TRIAGE NOTES
Pt arrives to ED with c/o low blood sugar states he was at clinic earlier today and they called him back with lab work and stated his blood sugar of 56 and told to go to the er. Pt not a diabetic. Pt had half a glass of orange juice and came here. Went to urgent care with flank pain/kidney stone pain.

## 2022-02-12 NOTE — ED PROVIDER NOTES
EMERGENCY DEPARTMENT ENCOUNTER      NAME: Ehsan Rogers  AGE: 51 year old male  YOB: 1970  MRN: [id number]  EVALUATION DATE & TIME: 2022  6:04 PM    PCP: No Ref-Primary, Physician    ED PROVIDER: FILEMON Vazquez CNP      Chief Complaint   Patient presents with     Hypoglycemia         FINAL IMPRESSION:  1. Abnormal blood sugar          ED COURSE & MEDICAL DECISION MAKIN:18 PM I met with the patient, obtained history, performed an initial exam, and discussed options and plan for treatment here in the ED.    Pertinent Labs & Imaging studies reviewed. (See chart for details)  51 year old male presents to the Emergency Department for evaluation of incidental finding of mild hyperglycemia but asymptomatic. Did drink some juice after he was informed of the low blood sugar. Repeat glucose here 112. Again, asymptomatic. Given reassurance. Understands that these are not screenings for diabetes and that he should follow-up in clinic to establish care and have a more formal evaluation. Discussed signs and symptoms which would necessitate return to the emergency department    At the conclusion of the encounter I discussed the results of all of the tests and the disposition. The questions were answered. The patient or family acknowledged understanding and was agreeable with the care plan.           MEDICATIONS GIVEN IN THE EMERGENCY:  Medications - No data to display    NEW PRESCRIPTIONS STARTED AT TODAY'S ER VISIT  New Prescriptions    No medications on file            =================================================================    HPI    Patient information was obtained from: patient    Use of Intrepreter: N/A        Ehsan Rogers is a 51 year old male with a history of anxiety and depression who presents today for evaluation of hypoglycemia. He was at the walk-in clinic earlier today for evaluation of some flank pain. Did have urinalysis, metabolic panel, CBC obtained. Glucose did  return at 58 the patient was asymptomatic. He was contacted by phone and instructed to go to the ER. Did drink juice after he was informed. Does have a family history of diabetes but no personal history. Denies any diaphoresis, confusion, excessive thirst or polyuria. Has no other complaints or concerns at this time.      REVIEW OF SYSTEMS   Review of Systems   Constitutional: Negative for diaphoresis.   Endocrine: Negative for polydipsia and polyuria.   Neurological: Negative for weakness.   Psychiatric/Behavioral: Negative for confusion.       PAST MEDICAL HISTORY:  Past Medical History:   Diagnosis Date     Alcohol abuse, in remission 7/6/2009     Generalized anxiety disorder 8/17/2006     Major depressive disorder, recurrent episode, in partial or unspecified remission 4/29/2014       PAST SURGICAL HISTORY:  No past surgical history on file.        CURRENT MEDICATIONS:    Prior to Admission Medications   Prescriptions Last Dose Informant Patient Reported? Taking?   DULoxetine HCl (CYMBALTA PO)   Yes No   Sig: Take 20 mg by mouth daily   Patient not taking: Reported on 7/21/2021   Ibuprofen (ADVIL PO)   Yes No   Patient not taking: Reported on 7/21/2021   calcium carbonate (TUMS) 500 MG chewable tablet   Yes No   Sig: Take 1 chew tab by mouth 2 times daily   Patient not taking: Reported on 2/11/2022   escitalopram (LEXAPRO) 20 MG tablet   Yes No   Sig: Take 20 mg by mouth daily    omeprazole (PRILOSEC) 40 MG DR capsule   No No   Sig: Take 1 capsule (40 mg) by mouth daily   Patient not taking: Reported on 2/11/2022   oxyCODONE-acetaminophen (PERCOCET) 5-325 MG per tablet   No No   Sig: Take 1-2 tablets by mouth every 4 hours as needed for pain   Patient not taking: Reported on 8/14/2017   traZODone (DESYREL) 100 MG tablet   Yes No   Sig: Take 100 mg by mouth At Bedtime.      Facility-Administered Medications: None           ALLERGIES:  No Known Allergies    FAMILY HISTORY:  Family History   Problem Relation Age of  "Onset     Anxiety Disorder Mother      Depression Father        SOCIAL HISTORY:   Social History     Socioeconomic History     Marital status:      Spouse name: Not on file     Number of children: Not on file     Years of education: Not on file     Highest education level: Not on file   Occupational History     Not on file   Tobacco Use     Smoking status: Never Smoker     Smokeless tobacco: Never Used   Substance and Sexual Activity     Alcohol use: Yes     Comment: drinks approx. 300-700 ml vodka daily     Drug use: No     Sexual activity: Yes     Partners: Female   Other Topics Concern     Parent/sibling w/ CABG, MI or angioplasty before 65F 55M? No   Social History Narrative     Not on file     Social Determinants of Health     Financial Resource Strain: Not on file   Food Insecurity: Not on file   Transportation Needs: Not on file   Physical Activity: Not on file   Stress: Not on file   Social Connections: Not on file   Intimate Partner Violence: Not on file   Housing Stability: Not on file         VITALS:  Patient Vitals for the past 24 hrs:   BP Temp Temp src Pulse Resp SpO2 Height Weight   02/11/22 1801 (!) 140/92 98.3  F (36.8  C) Oral 70 16 100 % 1.854 m (6' 1\") 107.5 kg (237 lb)       PHYSICAL EXAM    Constitutional:  Well developed, well nourished, no acute distress  EYES: Conjunctivae clear  HENT:  Atraumatic, normocephalic  Respiratory:  No respiratory distress  Integument: Warm, Dry  Neurologic:  Alert & oriented x 3              LAB:  All pertinent labs reviewed and interpreted.  Results for orders placed or performed during the hospital encounter of 02/11/22   Glucose by meter   Result Value Ref Range    GLUCOSE BY METER POCT 112 (H) 70 - 99 mg/dL       RADIOLOGY:  Reviewed all pertinent imaging. Please see official radiology report.  No orders to display         PROCEDURES:   None      FILEMON Vazquez, CNP  Emergency Medicine  [facility] " [place] EMERGENCY ROOM  On license of [facility] [facility] [address]  [phone]  Dept: [phone]         Blue Holloway, APRN CNP  02/11/22 1837

## 2022-02-12 NOTE — DISCHARGE INSTRUCTIONS
Please follow up in clinic sometime in the next few weeks to establish care    If you develop weakness, confusion, sweating, passing out, have excessive thirst or excessive urination or other concerns, return to the emergency department for further evaluation.

## 2022-02-12 NOTE — ED NOTES
Pt taught about s/s of hypo/hyperglycemia, and what to look out for. Pt agreeable to establishing a PCP. See providers notes for further assessment.

## 2022-08-21 ENCOUNTER — HEALTH MAINTENANCE LETTER (OUTPATIENT)
Age: 52
End: 2022-08-21

## 2022-09-22 ENCOUNTER — OFFICE VISIT (OUTPATIENT)
Dept: FAMILY MEDICINE | Facility: CLINIC | Age: 52
End: 2022-09-22
Payer: COMMERCIAL

## 2022-09-22 VITALS
SYSTOLIC BLOOD PRESSURE: 134 MMHG | WEIGHT: 233.8 LBS | HEART RATE: 81 BPM | TEMPERATURE: 98.8 F | DIASTOLIC BLOOD PRESSURE: 87 MMHG | RESPIRATION RATE: 16 BRPM | BODY MASS INDEX: 30.85 KG/M2 | OXYGEN SATURATION: 97 %

## 2022-09-22 DIAGNOSIS — H61.21 IMPACTED CERUMEN OF RIGHT EAR: Primary | ICD-10-CM

## 2022-09-22 PROCEDURE — 99213 OFFICE O/P EST LOW 20 MIN: CPT | Performed by: FAMILY MEDICINE

## 2022-09-22 NOTE — PROGRESS NOTES
Assessment:       Impacted cerumen of right ear     Plan:     Patient with impacted cerumen of the right ear with excellent relief of symptoms following ear lavage.  Follow-up if symptoms recur.        Subjective:       52 year old male presents for evaluation of his right ear feeling plugged.  Is been bothering him for several months but is gotten particularly worse over the past 3 to 4 days.  He denies any nasal congestion, cough, fevers, tinnitus, vertigo, or drainage from his ear.  He has tried mineral oil and hydrogen peroxide which has not helped his symptoms.  His hearing is very muffled.    Patient Active Problem List   Diagnosis     AC separation, right, subsequent encounter     Subacromial bursitis of left shoulder joint     Alcohol abuse, in remission     Major depressive disorder, recurrent episode, in partial or unspecified remission     Generalized anxiety disorder       Past Medical History:   Diagnosis Date     Alcohol abuse, in remission 7/6/2009     Generalized anxiety disorder 8/17/2006     Major depressive disorder, recurrent episode, in partial or unspecified remission 4/29/2014       No past surgical history on file.    Current Outpatient Medications   Medication     calcium carbonate (TUMS) 500 MG chewable tablet     DULoxetine HCl (CYMBALTA PO)     escitalopram (LEXAPRO) 20 MG tablet     Ibuprofen (ADVIL PO)     omeprazole (PRILOSEC) 40 MG DR capsule     oxyCODONE-acetaminophen (PERCOCET) 5-325 MG per tablet     traZODone (DESYREL) 100 MG tablet     No current facility-administered medications for this visit.       No Known Allergies    Family History   Problem Relation Age of Onset     Anxiety Disorder Mother      Depression Father        Social History     Socioeconomic History     Marital status:      Spouse name: None     Number of children: None     Years of education: None     Highest education level: None   Tobacco Use     Smoking status: Never Smoker     Smokeless tobacco:  Never Used   Substance and Sexual Activity     Alcohol use: Yes     Comment: drinks approx. 300-700 ml vodka daily     Drug use: No     Sexual activity: Yes     Partners: Female   Other Topics Concern     Parent/sibling w/ CABG, MI or angioplasty before 65F 55M? No          Review of Systems  Pertinent items are noted in HPI.      Objective:     /87   Pulse 81   Temp 98.8  F (37.1  C) (Oral)   Resp 16   Wt 106.1 kg (233 lb 12.8 oz)   SpO2 97%   BMI 30.85 kg/m      General: Alert, pleasant, no distress  Ears: Left ear canal clear and tympanic membrane normal.  Right ear canal noted to have impacted cerumen.  Ear lavage performed by support staff and ear reevaluated.  Ear canal now clear of cerumen with excellent relief of patient's symptoms.    This note has been dictated using voice recognition software. Any grammatical or context distortions are unintentional and inherent to the software

## 2022-10-26 ENCOUNTER — OFFICE VISIT (OUTPATIENT)
Dept: FAMILY MEDICINE | Facility: CLINIC | Age: 52
End: 2022-10-26
Payer: COMMERCIAL

## 2022-10-26 VITALS
BODY MASS INDEX: 30.74 KG/M2 | OXYGEN SATURATION: 99 % | DIASTOLIC BLOOD PRESSURE: 82 MMHG | RESPIRATION RATE: 14 BRPM | TEMPERATURE: 98.4 F | SYSTOLIC BLOOD PRESSURE: 128 MMHG | WEIGHT: 233 LBS | HEART RATE: 63 BPM

## 2022-10-26 DIAGNOSIS — H66.91: Primary | ICD-10-CM

## 2022-10-26 DIAGNOSIS — H60.502 ACUTE OTITIS EXTERNA OF LEFT EAR, UNSPECIFIED TYPE: ICD-10-CM

## 2022-10-26 PROCEDURE — 99214 OFFICE O/P EST MOD 30 MIN: CPT | Performed by: FAMILY MEDICINE

## 2022-10-26 RX ORDER — CIPROFLOXACIN AND DEXAMETHASONE 3; 1 MG/ML; MG/ML
4 SUSPENSION/ DROPS AURICULAR (OTIC) 2 TIMES DAILY
Qty: 4 ML | Refills: 0 | Status: SHIPPED | OUTPATIENT
Start: 2022-10-26 | End: 2022-11-05

## 2022-10-26 NOTE — PATIENT INSTRUCTIONS
Augmentin (amox/clav) twice a day for 10 days  Yogurt or probiotics  Cipro-dex drops in right ear canal twice a day for 10 days    If not improving, or worse, may need further evaluation    May take ibuprofen or tylenol for pain or inflammation.     Warm or cold packs as needed

## 2022-10-27 NOTE — PROGRESS NOTES
Assessment/Plan:   Acute otitis externa of left ear, unspecified type  Subacute otitis media of right ear  Increasing right ear pain since ear lavage about a month ago for cerumen impaction. On exam there is external otitis consistent with a secondary swimmer's ear after ear lavage. Also there is some bulging of the right TM and dull redness suggesting meddle ear effusion and possible otitis media as well. No perforation. We will treat these two things with augmentin and ciprodex.   He has had off and on ear pressure and pain for months prior to last month's visit which was prompted by increase plugged sensation and more pain. I suspect these were related to eustachian tube dysfunction and/or TMJ along with the excess wax.   Consider adding flonase daily for 2-4 weeks and ongoing as well as some TMJ precautions if symptoms persist after this trial of treatment.   Consider ENT evaluation as needed for no improvement.   - amoxicillin-clavulanate (AUGMENTIN) 875-125 MG tablet; Take 1 tablet by mouth 2 times daily for 10 days  Dispense: 20 tablet; Refill: 0  - ciprofloxacin-dexamethasone (CIPRODEX) 0.3-0.1 % otic suspension; Place 4 drops into the right ear 2 times daily for 10 days  Dispense: 4 mL; Refill: 0    I discussed red flag symptoms, return precautions to clinic/ER and follow up care with patient/parent.  Expected clinical course, symptomatic cares advised. Questions answered. Patient/parent amenable with plan.    Augmentin (amox/clav) twice a day for 10 days  Yogurt or probiotics  Cipro-dex drops in right ear canal twice a day for 10 days    If not improving, or worse, may need further evaluation. Consider TMJ or eustachian tube dysfunction.     May take ibuprofen or tylenol for pain or inflammation.     Warm or cold packs as needed    ENT referral placed in case not improving.       Subjective:     Ehsan Rogers is a 52 year old male who presents for evaluation of right ear pain. He presented to clinic a  month ago with right ear plugging and pain and was found to have cerumen impaction. Following ear lavage the plugging was relieved.   Since then however he has had increasing right ear pain and tenderness, very sore when using qtips near the opening. Some deeper pain inside as well. Feels a bit plugged still but the hearing is okay.   No nasal congestion. No fever or chills or ST. No cough. No significant ear drainage  He has had off and on ear pain and plugging for several months - usually right ear but sometimes left.   These episodes have become bothersome to him. Last month he had more persistent plugging in the right ear and some pain worsening over the preceding 3-4 weeks.   He was worried about infection and did not feel reassured after the ear lavage.   Now having increasing pain.   Otherwise feels well.     No Known Allergies     Current Outpatient Medications   Medication     amoxicillin-clavulanate (AUGMENTIN) 875-125 MG tablet     ciprofloxacin-dexamethasone (CIPRODEX) 0.3-0.1 % otic suspension     calcium carbonate (TUMS) 500 MG chewable tablet     DULoxetine HCl (CYMBALTA PO)     escitalopram (LEXAPRO) 20 MG tablet     Ibuprofen (ADVIL PO)     omeprazole (PRILOSEC) 40 MG DR capsule     oxyCODONE-acetaminophen (PERCOCET) 5-325 MG per tablet     traZODone (DESYREL) 100 MG tablet     No current facility-administered medications for this visit.     Patient Active Problem List   Diagnosis     AC separation, right, subsequent encounter     Subacromial bursitis of left shoulder joint     Alcohol abuse, in remission     Major depressive disorder, recurrent episode, in partial or unspecified remission     Generalized anxiety disorder       Objective:     /82   Pulse 63   Temp 98.4  F (36.9  C) (Oral)   Resp 14   Wt 105.7 kg (233 lb)   SpO2 99%   BMI 30.74 kg/m      Physical    General Appearance: Alert, pleasant, no distress, AVSS  Head: Normocephalic, without obvious abnormality, atraumatic  Eyes:  Conjunctivae are normal.   Ears: right ear: the canal is swollen and red, scant drainage. TM is dull red with no light reflex and obscured landmarks. Mild pain with retraction of the pinna, more so with pressure on the tragus.   Left ear: Normal TM and external ear canal  Nose: No significant congestion.  Throat: Throat is normal.  No exudate.  No vesicular lesions. Mild right TMJ tenderness with palpation while jaw opening.   Neck: No adenopathy  Lungs: respirations unlaed   Skin: no rashes or lesions around the ears.   Psychiatric: Patient has a normal mood and affect.             This note has been dictated in part using voice recognition software.  Any grammatical or context distortions are unintentional and inherent to the software.  Please feel free to contact me directly for clarification if needed.

## 2022-11-21 ENCOUNTER — HEALTH MAINTENANCE LETTER (OUTPATIENT)
Age: 52
End: 2022-11-21

## 2023-09-17 ENCOUNTER — HEALTH MAINTENANCE LETTER (OUTPATIENT)
Age: 53
End: 2023-09-17

## 2024-06-26 SDOH — HEALTH STABILITY: PHYSICAL HEALTH: ON AVERAGE, HOW MANY MINUTES DO YOU ENGAGE IN EXERCISE AT THIS LEVEL?: 0 MIN

## 2024-06-26 SDOH — HEALTH STABILITY: PHYSICAL HEALTH: ON AVERAGE, HOW MANY DAYS PER WEEK DO YOU ENGAGE IN MODERATE TO STRENUOUS EXERCISE (LIKE A BRISK WALK)?: 0 DAYS

## 2024-06-26 ASSESSMENT — SOCIAL DETERMINANTS OF HEALTH (SDOH): HOW OFTEN DO YOU GET TOGETHER WITH FRIENDS OR RELATIVES?: NEVER

## 2024-07-01 ENCOUNTER — OFFICE VISIT (OUTPATIENT)
Dept: FAMILY MEDICINE | Facility: CLINIC | Age: 54
End: 2024-07-01
Payer: COMMERCIAL

## 2024-07-01 VITALS
HEART RATE: 70 BPM | BODY MASS INDEX: 29.03 KG/M2 | RESPIRATION RATE: 16 BRPM | HEIGHT: 73 IN | TEMPERATURE: 98 F | SYSTOLIC BLOOD PRESSURE: 125 MMHG | WEIGHT: 219 LBS | OXYGEN SATURATION: 97 % | DIASTOLIC BLOOD PRESSURE: 79 MMHG

## 2024-07-01 DIAGNOSIS — F10.11 ALCOHOL ABUSE, IN REMISSION: ICD-10-CM

## 2024-07-01 DIAGNOSIS — Z11.4 SCREENING FOR HIV (HUMAN IMMUNODEFICIENCY VIRUS): ICD-10-CM

## 2024-07-01 DIAGNOSIS — F33.41 RECURRENT MAJOR DEPRESSIVE DISORDER, IN PARTIAL REMISSION (H): ICD-10-CM

## 2024-07-01 DIAGNOSIS — Z12.11 SCREEN FOR COLON CANCER: ICD-10-CM

## 2024-07-01 DIAGNOSIS — Z00.00 ANNUAL PHYSICAL EXAM: Primary | ICD-10-CM

## 2024-07-01 DIAGNOSIS — F41.1 GENERALIZED ANXIETY DISORDER: ICD-10-CM

## 2024-07-01 PROBLEM — M75.52 SUBACROMIAL BURSITIS OF LEFT SHOULDER JOINT: Status: RESOLVED | Noted: 2017-09-25 | Resolved: 2024-07-01

## 2024-07-01 LAB
BASOPHILS # BLD AUTO: 0 10E3/UL (ref 0–0.2)
BASOPHILS NFR BLD AUTO: 0 %
EOSINOPHIL # BLD AUTO: 0.1 10E3/UL (ref 0–0.7)
EOSINOPHIL NFR BLD AUTO: 2 %
ERYTHROCYTE [DISTWIDTH] IN BLOOD BY AUTOMATED COUNT: 11.7 % (ref 10–15)
HCT VFR BLD AUTO: 44 % (ref 40–53)
HGB BLD-MCNC: 15.1 G/DL (ref 13.3–17.7)
IMM GRANULOCYTES # BLD: 0 10E3/UL
IMM GRANULOCYTES NFR BLD: 0 %
LYMPHOCYTES # BLD AUTO: 2.1 10E3/UL (ref 0.8–5.3)
LYMPHOCYTES NFR BLD AUTO: 39 %
MCH RBC QN AUTO: 32 PG (ref 26.5–33)
MCHC RBC AUTO-ENTMCNC: 34.3 G/DL (ref 31.5–36.5)
MCV RBC AUTO: 93 FL (ref 78–100)
MONOCYTES # BLD AUTO: 0.5 10E3/UL (ref 0–1.3)
MONOCYTES NFR BLD AUTO: 9 %
NEUTROPHILS # BLD AUTO: 2.7 10E3/UL (ref 1.6–8.3)
NEUTROPHILS NFR BLD AUTO: 50 %
PLATELET # BLD AUTO: 203 10E3/UL (ref 150–450)
RBC # BLD AUTO: 4.72 10E6/UL (ref 4.4–5.9)
WBC # BLD AUTO: 5.4 10E3/UL (ref 4–11)

## 2024-07-01 PROCEDURE — 36415 COLL VENOUS BLD VENIPUNCTURE: CPT | Performed by: FAMILY MEDICINE

## 2024-07-01 PROCEDURE — 99214 OFFICE O/P EST MOD 30 MIN: CPT | Mod: 25 | Performed by: FAMILY MEDICINE

## 2024-07-01 PROCEDURE — 99396 PREV VISIT EST AGE 40-64: CPT | Performed by: FAMILY MEDICINE

## 2024-07-01 PROCEDURE — G0103 PSA SCREENING: HCPCS | Performed by: FAMILY MEDICINE

## 2024-07-01 PROCEDURE — 84443 ASSAY THYROID STIM HORMONE: CPT | Performed by: FAMILY MEDICINE

## 2024-07-01 PROCEDURE — 87389 HIV-1 AG W/HIV-1&-2 AB AG IA: CPT | Performed by: FAMILY MEDICINE

## 2024-07-01 PROCEDURE — 80053 COMPREHEN METABOLIC PANEL: CPT | Performed by: FAMILY MEDICINE

## 2024-07-01 PROCEDURE — 80061 LIPID PANEL: CPT | Performed by: FAMILY MEDICINE

## 2024-07-01 PROCEDURE — 96127 BRIEF EMOTIONAL/BEHAV ASSMT: CPT | Performed by: FAMILY MEDICINE

## 2024-07-01 PROCEDURE — 85025 COMPLETE CBC W/AUTO DIFF WBC: CPT | Performed by: FAMILY MEDICINE

## 2024-07-01 RX ORDER — HYDROXYZINE HYDROCHLORIDE 25 MG/1
25 TABLET, FILM COATED ORAL 2 TIMES DAILY PRN
COMMUNITY
Start: 2024-06-23

## 2024-07-01 RX ORDER — HYDROXYZINE HYDROCHLORIDE 10 MG/1
10 TABLET, FILM COATED ORAL
COMMUNITY
Start: 2024-05-08 | End: 2024-07-01

## 2024-07-01 RX ORDER — TRAZODONE HYDROCHLORIDE 100 MG/1
100 TABLET ORAL AT BEDTIME
COMMUNITY
Start: 2024-01-01

## 2024-07-01 RX ORDER — TRAZODONE HYDROCHLORIDE 50 MG/1
25 TABLET, FILM COATED ORAL AT BEDTIME
COMMUNITY
Start: 2023-12-29

## 2024-07-01 RX ORDER — GABAPENTIN 100 MG/1
100 CAPSULE ORAL
COMMUNITY
Start: 2024-01-01 | End: 2024-07-01

## 2024-07-01 RX ORDER — VENLAFAXINE HYDROCHLORIDE 75 MG/1
75 CAPSULE, EXTENDED RELEASE ORAL DAILY
COMMUNITY
Start: 2024-06-07 | End: 2024-07-01

## 2024-07-01 RX ORDER — VENLAFAXINE HYDROCHLORIDE 37.5 MG/1
37.5 CAPSULE, EXTENDED RELEASE ORAL DAILY
COMMUNITY
Start: 2024-05-16 | End: 2024-07-01

## 2024-07-01 RX ORDER — BUPROPION HYDROCHLORIDE 150 MG/1
TABLET ORAL
COMMUNITY
Start: 2021-08-01

## 2024-07-01 RX ORDER — FOLIC ACID 1 MG/1
1 TABLET ORAL
COMMUNITY
Start: 2024-05-17 | End: 2024-07-01

## 2024-07-01 RX ORDER — VENLAFAXINE HYDROCHLORIDE 150 MG/1
150 TABLET, EXTENDED RELEASE ORAL DAILY
COMMUNITY
Start: 2024-04-30 | End: 2024-07-01

## 2024-07-01 RX ORDER — VENLAFAXINE HYDROCHLORIDE 150 MG/1
150 CAPSULE, EXTENDED RELEASE ORAL DAILY
COMMUNITY
Start: 2024-06-20

## 2024-07-01 RX ORDER — ESCITALOPRAM OXALATE 10 MG/1
1 TABLET ORAL
COMMUNITY
Start: 2023-12-26 | End: 2024-07-01

## 2024-07-01 ASSESSMENT — PAIN SCALES - GENERAL: PAINLEVEL: NO PAIN (0)

## 2024-07-01 ASSESSMENT — PATIENT HEALTH QUESTIONNAIRE - PHQ9
SUM OF ALL RESPONSES TO PHQ QUESTIONS 1-9: 0
10. IF YOU CHECKED OFF ANY PROBLEMS, HOW DIFFICULT HAVE THESE PROBLEMS MADE IT FOR YOU TO DO YOUR WORK, TAKE CARE OF THINGS AT HOME, OR GET ALONG WITH OTHER PEOPLE: NOT DIFFICULT AT ALL
SUM OF ALL RESPONSES TO PHQ QUESTIONS 1-9: 0

## 2024-07-01 NOTE — PROGRESS NOTES
"Preventive Care Visit  [facility] FERNANDO Diaz MD, Family Medicine  Jul 1, 2024      Assessment & Plan     Annual physical exam  Encouraged healthy lifestyle.  Reviewed immunizations, but patient declines today.  Check nonfasting labs and notify with results.  - HIV Antigen Antibody Combo; Future  - CBC with Platelets & Differential; Future  - Comprehensive metabolic panel; Future  - Lipid Profile; Future  - TSH with free T4 reflex; Future  - Prostate Specific Antigen Screen; Future  - HIV Antigen Antibody Combo  - CBC with Platelets & Differential  - Comprehensive metabolic panel  - Lipid Profile  - TSH with free T4 reflex  - Prostate Specific Antigen Screen    Screen for colon cancer  - Colonoscopy Screening  Referral; Future    Screening for HIV (human immunodeficiency virus)  - HIV Antigen Antibody Combo; Future  - HIV Antigen Antibody Combo    Alcohol abuse, in remission  Stable.  Continue to to monitor and following with psychiatrist.    Generalized anxiety disorder  Controlled.  Continue following with psychiatrist.    Recurrent major depressive disorder, in partial remission (H24)  Controlled.  Continue following with psychiatrist.            BMI  Estimated body mass index is 29.13 kg/m  as calculated from the following:    Height as of this encounter: 1.847 m (6' 0.7\").    Weight as of this encounter: 99.3 kg (219 lb).   Weight management plan: Discussed healthy diet and exercise guidelines    Counseling  Appropriate preventive services were discussed with this patient, including applicable screening as appropriate for fall prevention, nutrition, physical activity, Tobacco-use cessation, weight loss and cognition.  Checklist reviewing preventive services available has been given to the patient.  Reviewed patient's diet, addressing concerns and/or questions.   Patient is at risk for social isolation and has been provided with information about the benefit of social " connection.   The patient was instructed to see the dentist every 6 months.           Tk Moser is a 54 year old, presenting for the following:  Physical (Not fasting. )  Answers submitted by the patient for this visit:  Patient Health Questionnaire (Submitted on 7/1/2024)  If you checked off any problems, how difficult have these problems made it for you to do your work, take care of things at home, or get along with other people?: Not difficult at all  PHQ9 TOTAL SCORE: 0        7/1/2024     1:23 PM   Additional Questions   Roomed by Ana Paula Kelsey, Visit Facilitator       Health Care Directive  Patient does not have a Health Care Directive or Living Will: Patient states has Advance Directive and will bring in a copy to clinic.    HPI    Patient requesting labs and colonoscopy referral.  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  He hopes to get back to something less stressful.    Exercise: Swimming 2 times per week, 15 minutes per time.    Alcohol abuse: Patient stopped drinking in 2015, although he has had a few relapses with his last relapse being about 9 months ago.    MDD/KARL: Patient is being followed by the psychiatrist and is currently on Wellbutrin, Effexor, trazodone and as needed hydroxyzine.  He states that overall he is doing good, no suicidal ideation.            6/26/2024   General Health   How would you rate your overall physical health? (!) FAIR   Feel stress (tense, anxious, or unable to sleep) Very much      (!) STRESS CONCERN      6/26/2024   Nutrition   Three or more servings of calcium each day? (!) NO   Diet: Regular (no restrictions)   How many servings of fruit and vegetables per day? (!) 0-1   How many sweetened beverages each day? 0-1            6/26/2024   Exercise   Days per week of moderate/strenous exercise 0 days   Average minutes spent exercising at this level 0 min      (!) EXERCISE CONCERN      6/26/2024   Social Factors   Frequency  of gathering with friends or relatives Never   Worry food won't last until get money to buy more No   Food not last or not have enough money for food? No   Do you have housing? (Housing is defined as stable permanent housing and does not include staying ouside in a car, in a tent, in an abandoned building, in an overnight shelter, or couch-surfing.) Yes   Are you worried about losing your housing? No   Lack of transportation? No   Unable to get utilities (heat,electricity)? No      (!) SOCIAL CONNECTIONS CONCERN      6/26/2024   Fall Risk   Fallen 2 or more times in the past year? Yes   Trouble with walking or balance? No              6/26/2024   Dental   Dentist two times every year? (!) NO            6/26/2024   TB Screening   Were you born outside of the [place]? No          Today's PHQ-9 Score:       7/1/2024     1:21 PM   PHQ-9 SCORE   PHQ-9 Total Score MyChart 0   PHQ-9 Total Score 0         6/26/2024   Substance Use   Alcohol more than 3/day or more than 7/wk Not Applicable   Do you use any other substances recreationally? No        Social History     Tobacco Use    Smoking status: Never     Passive exposure: Never    Smokeless tobacco: Never   Vaping Use    Vaping status: Never Used   Substance Use Topics    Alcohol use: Yes     Comment: drinks approx. 300-700 ml vodka daily    Drug use: No             6/26/2024   One time HIV Screening   Previous HIV test? Yes          6/26/2024   STI Screening   New sexual partner(s) since last STI/HIV test? No      Last PSA:   Prostate Specific Antigen Screen   Date Value Ref Range Status   12/31/2019 0.2 0.0 - 3.5 ng/mL Final     ASCVD Risk   The ASCVD Risk score (Ivory ROGERS, et al., 2019) failed to calculate for the following reasons:    The systolic blood pressure is missing           Reviewed and updated as needed this visit by Provider                             Objective    Exam  There were no vitals taken for this visit.   Estimated body mass index is 30.74  "kg/m  as calculated from the following:    Height as of 2/11/22: 1.854 m (6' 1\").    Weight as of 10/26/22: 105.7 kg (233 lb).    Physical Exam          Signed Electronically by: Preet Diaz MD    "

## 2024-07-02 LAB
ALBUMIN SERPL BCG-MCNC: 4.4 G/DL (ref 3.5–5.2)
ALP SERPL-CCNC: 38 U/L (ref 40–150)
ALT SERPL W P-5'-P-CCNC: 34 U/L (ref 0–70)
ANION GAP SERPL CALCULATED.3IONS-SCNC: 8 MMOL/L (ref 7–15)
AST SERPL W P-5'-P-CCNC: 23 U/L (ref 0–45)
BILIRUB SERPL-MCNC: 0.4 MG/DL
BUN SERPL-MCNC: 11.9 MG/DL (ref 6–20)
CALCIUM SERPL-MCNC: 9.2 MG/DL (ref 8.6–10)
CHLORIDE SERPL-SCNC: 105 MMOL/L (ref 98–107)
CHOLEST SERPL-MCNC: 186 MG/DL
CREAT SERPL-MCNC: 1.12 MG/DL (ref 0.67–1.17)
DEPRECATED HCO3 PLAS-SCNC: 29 MMOL/L (ref 22–29)
EGFRCR SERPLBLD CKD-EPI 2021: 78 ML/MIN/1.73M2
FASTING STATUS PATIENT QL REPORTED: ABNORMAL
FASTING STATUS PATIENT QL REPORTED: ABNORMAL
GLUCOSE SERPL-MCNC: 76 MG/DL (ref 70–99)
HDLC SERPL-MCNC: 50 MG/DL
HIV 1+2 AB+HIV1 P24 AG SERPL QL IA: NONREACTIVE
LDLC SERPL CALC-MCNC: 106 MG/DL
NONHDLC SERPL-MCNC: 136 MG/DL
POTASSIUM SERPL-SCNC: 4.5 MMOL/L (ref 3.4–5.3)
PROT SERPL-MCNC: 7.4 G/DL (ref 6.4–8.3)
PSA SERPL DL<=0.01 NG/ML-MCNC: 0.36 NG/ML (ref 0–3.5)
SODIUM SERPL-SCNC: 142 MMOL/L (ref 135–145)
TRIGL SERPL-MCNC: 150 MG/DL
TSH SERPL DL<=0.005 MIU/L-ACNC: 1.7 UIU/ML (ref 0.3–4.2)

## 2024-07-12 ENCOUNTER — TELEPHONE (OUTPATIENT)
Dept: FAMILY MEDICINE | Facility: CLINIC | Age: 54
End: 2024-07-12

## 2024-07-12 ENCOUNTER — LAB (OUTPATIENT)
Dept: LAB | Facility: CLINIC | Age: 54
End: 2024-07-12
Payer: COMMERCIAL

## 2024-07-12 DIAGNOSIS — Z00.00 ANNUAL PHYSICAL EXAM: ICD-10-CM

## 2024-07-12 DIAGNOSIS — Z00.00 ANNUAL PHYSICAL EXAM: Primary | ICD-10-CM

## 2024-07-12 PROCEDURE — 36415 COLL VENOUS BLD VENIPUNCTURE: CPT

## 2024-07-12 PROCEDURE — 84403 ASSAY OF TOTAL TESTOSTERONE: CPT

## 2024-07-12 PROCEDURE — 84270 ASSAY OF SEX HORMONE GLOBUL: CPT

## 2024-07-12 NOTE — TELEPHONE ENCOUNTER
Patient is requesting we call him with lab results done today - 07/12/2024 around 1:30PM, for testosterone results

## 2024-07-12 NOTE — TELEPHONE ENCOUNTER
"Reason: COLONOSCOPY REFERRAL AND TESTOSTERONE LABS  Patient came in to clinic today - talked with , is requesting colonoscopy referral be placed. He called to schedule with [facility] and was told he needs a referral. It looks like the referral is placed as \"PENDING\" and not \"FUTURE\". Patient was able to schedule with [facility] as they book appointments far in advance but patient will need to update them when the referral is placed in order for insurance to cover.    Patient also would like to know about testosterone lab work and was under the impression that this would be done with his labs on 07/01/2024.     Patient would like a call, update patient when referral is updated, along with number to schedule. Patient is not active on [employer] and does not regularly look at messages.   "

## 2024-07-13 LAB — SHBG SERPL-SCNC: 42 NMOL/L (ref 11–80)

## 2024-07-16 LAB
TESTOST FREE SERPL-MCNC: 5.52 NG/DL
TESTOST SERPL-MCNC: 324 NG/DL (ref 240–950)

## 2024-07-17 ENCOUNTER — TELEPHONE (OUTPATIENT)
Dept: FAMILY MEDICINE | Facility: CLINIC | Age: 54
End: 2024-07-17
Payer: COMMERCIAL

## 2024-07-17 NOTE — TELEPHONE ENCOUNTER
----- Message from Preet Diaz sent at 7/16/2024  2:02 PM CDT -----  Please notify patient his testosterone level is normal (patient does not check MyChart).    Preet Diaz MD

## 2024-07-17 NOTE — TELEPHONE ENCOUNTER
Attempted to call patient regarding lab results/provider message. Left message to call back [phone]. Upon patient call back, OKAY to relay results/message per provider.

## 2024-07-18 ENCOUNTER — TRANSFERRED RECORDS (OUTPATIENT)
Dept: HEALTH INFORMATION MANAGEMENT | Facility: CLINIC | Age: 54
End: 2024-07-18
Payer: COMMERCIAL

## 2025-04-19 ENCOUNTER — APPOINTMENT (OUTPATIENT)
Dept: RADIOLOGY | Facility: CLINIC | Age: 55
End: 2025-04-19
Attending: EMERGENCY MEDICINE
Payer: COMMERCIAL

## 2025-04-19 ENCOUNTER — HOSPITAL ENCOUNTER (EMERGENCY)
Facility: CLINIC | Age: 55
Discharge: HOME | End: 2025-04-19
Attending: EMERGENCY MEDICINE | Admitting: EMERGENCY MEDICINE
Payer: COMMERCIAL

## 2025-04-19 VITALS
WEIGHT: 230 LBS | TEMPERATURE: 98.4 F | BODY MASS INDEX: 30.48 KG/M2 | SYSTOLIC BLOOD PRESSURE: 136 MMHG | HEIGHT: 73 IN | HEART RATE: 93 BPM | OXYGEN SATURATION: 95 % | RESPIRATION RATE: 16 BRPM | DIASTOLIC BLOOD PRESSURE: 94 MMHG

## 2025-04-19 DIAGNOSIS — B34.9 VIRAL ILLNESS: ICD-10-CM

## 2025-04-19 DIAGNOSIS — R05.1 ACUTE COUGH: ICD-10-CM

## 2025-04-19 LAB
FLUAV RNA SPEC QL NAA+PROBE: NEGATIVE
FLUBV RNA RESP QL NAA+PROBE: NEGATIVE
RSV RNA SPEC NAA+PROBE: NEGATIVE
SARS-COV-2 RNA RESP QL NAA+PROBE: NEGATIVE

## 2025-04-19 PROCEDURE — 87637 SARSCOV2&INF A&B&RSV AMP PRB: CPT | Performed by: EMERGENCY MEDICINE

## 2025-04-19 PROCEDURE — 99284 EMERGENCY DEPT VISIT MOD MDM: CPT | Mod: 25 | Performed by: EMERGENCY MEDICINE

## 2025-04-19 PROCEDURE — 250N000013 HC RX MED GY IP 250 OP 250 PS 637: Performed by: EMERGENCY MEDICINE

## 2025-04-19 PROCEDURE — 71046 X-RAY EXAM CHEST 2 VIEWS: CPT

## 2025-04-19 RX ORDER — BENZONATATE 100 MG/1
100 CAPSULE ORAL ONCE
Status: COMPLETED | OUTPATIENT
Start: 2025-04-19 | End: 2025-04-19

## 2025-04-19 RX ORDER — OMEPRAZOLE 20 MG/1
40 CAPSULE, DELAYED RELEASE ORAL ONCE
Status: COMPLETED | OUTPATIENT
Start: 2025-04-19 | End: 2025-04-19

## 2025-04-19 RX ORDER — OMEPRAZOLE 20 MG/1
20 CAPSULE, DELAYED RELEASE ORAL DAILY
Qty: 30 CAPSULE | Refills: 0 | Status: SHIPPED | OUTPATIENT
Start: 2025-04-19 | End: 2025-05-19

## 2025-04-19 RX ORDER — BENZONATATE 200 MG/1
200 CAPSULE ORAL 3 TIMES DAILY PRN
Qty: 30 CAPSULE | Refills: 0 | Status: SHIPPED | OUTPATIENT
Start: 2025-04-19

## 2025-04-19 RX ADMIN — OMEPRAZOLE 40 MG: 20 CAPSULE, DELAYED RELEASE ORAL at 02:04

## 2025-04-19 RX ADMIN — BENZONATATE 100 MG: 100 CAPSULE ORAL at 01:59

## 2025-04-19 ASSESSMENT — COLUMBIA-SUICIDE SEVERITY RATING SCALE - C-SSRS
6. HAVE YOU EVER DONE ANYTHING, STARTED TO DO ANYTHING, OR PREPARED TO DO ANYTHING TO END YOUR LIFE?: NO
2. HAVE YOU ACTUALLY HAD ANY THOUGHTS OF KILLING YOURSELF IN THE PAST MONTH?: NO
1. IN THE PAST MONTH, HAVE YOU WISHED YOU WERE DEAD OR WISHED YOU COULD GO TO SLEEP AND NOT WAKE UP?: NO

## 2025-04-19 ASSESSMENT — ACTIVITIES OF DAILY LIVING (ADL)
ADLS_ACUITY_SCORE: 41
ADLS_ACUITY_SCORE: 41

## 2025-04-19 NOTE — ED PROVIDER NOTES
EMERGENCY DEPARTMENT ENCOUNTER      NAME: Ehsan Rogers  AGE: 55 year old male  YOB: 1970  MRN: [id number]  EVALUATION DATE & TIME: 4/19/2025  1:39 AM    PCP: Preet Diaz    ED PROVIDER: Deneen Carrillo M.D.      Chief Complaint   Patient presents with    Cough         FINAL IMPRESSION:  1. Acute cough    2. Viral illness        MEDICAL DECISION MAKING:    Pertinent Labs & Imaging studies reviewed. (See chart for details)  ED Course as of 04/19/25 0513   Sat Apr 19, 2025   0136 Outpatient records reviewed family practice visit 7/1/2024.   0139 Afebrile.  Vital signs here unremarkable.  Patient is coming in for evaluation of cough and runny nose.  Ongoing for the past 2 weeks, seems to be getting slightly worse.  Notes that he sleeps in a cool damp basement.  Has had some issues with acid reflux as well that it has been getting worse.  Nonproductive cough but says it feels deep in his chest.  Not describing any postnasal drip symptoms.  No fevers chills.  Influenza A has been prevalent in his household as well as 1 family member with pneumonia.    Physical exam for patient here unremarkable with the exception of some mild rhinorrhea.    Plan for x-ray, swab for COVID RSV and influenza.  Will give a Tessalon Perle and some Prilosec.  No coughing while patient was in the room.  Breathing comfortably.  Able to speak in full sentences.  No increased work of breathing.   0311 Independent review chest x-ray without acute process.  Swabs are negative.  Could be likely secondary to viral process versus acid reflux could be worsening this.  Will discharge home with cough medication and acid medication.         Medical Decision Making      Discharge. I prescribed additional prescription strength medication(s) as charted. See documentation for any additional details.    MIPS (CTPE, Dental pain, Palomo, Sinusitis, Asthma/COPD, Head Trauma): Not Applicable    SEPSIS: None          Critical care: 0 minutes  excluding separately billable procedures.  Includes bedside management, time reviewing test results, review of records, discussing the case with staff, documenting the medical record and time spent with family members (or surrogate decision makers) discussing specific treatment issues.          ED COURSE:  1:35 AM I met with the patient, obtained history, performed an initial exam, and discussed options and plan for diagnostics and treatment here in the ED.   3:14 AM We discussed the plan for discharge and the patient is agreeable. Reviewed supportive cares, symptomatic treatment, outpatient follow up, and reasons to return to the Emergency Department. Patient to be discharged by ED RN.      The importance of close follow up was discussed. We reviewed warning signs and symptoms, and I instructed Mr. Rogers to return to the emergency department immediately if he develops any new or worsening symptoms. I provided additional verbal discharge instructions. Mr. Rogers expressed understanding and agreement with this plan of care, his questions were answered, and he was discharged in stable condition.     MEDICATIONS GIVEN IN THE EMERGENCY:  Medications   benzonatate (TESSALON) capsule 100 mg (100 mg Oral $Given 4/19/25 0159)   omeprazole (PriLOSEC) CR capsule 40 mg (40 mg Oral $Given 4/19/25 0204)       NEW PRESCRIPTIONS STARTED AT TODAY'S ER VISIT:  Discharge Medication List as of 4/19/2025  3:26 AM        START taking these medications    Details   benzonatate (TESSALON) 200 MG capsule Take 1 capsule (200 mg) by mouth 3 times daily as needed for cough., Disp-30 capsule, R-0, Local Print      omeprazole (PRILOSEC) 20 MG DR capsule Take 1 capsule (20 mg) by mouth daily., Disp-30 capsule, R-0, Local Print                =================================================================    HPI    Patient information was obtained from: Patient    Use of : N/A     Ehsan Rogers is a 55 year old male with a  history of Bipolar who presents with a cough.    The patient reports here with a cough for the last few weeks, worsening.  He also reports shortness of breath.  He notes ongoing issues with  GERD that have flared recently. He notes being on medication for GERD a few years ago but is not currently. He does note sleeping in a cool, damp basement.  He notes exposures to pneumonia and influenza A.  No other complaints at this time.      REVIEW OF SYSTEMS   Refer to HPI. All other systems negative.    PAST MEDICAL HISTORY:  Past Medical History:   Diagnosis Date    Alcohol abuse, in remission 07/06/2009    Depressive disorder 06/01/1999    Major Depression & Anxiety since 90 s    Generalized anxiety disorder 08/17/2006    Major depressive disorder, recurrent episode, in partial or unspecified remission 04/29/2014       PAST SURGICAL HISTORY:  Past Surgical History:   Procedure Laterality Date    NO HISTORY OF SURGERY         CURRENT MEDICATIONS:    No current facility-administered medications for this encounter.    Current Outpatient Medications:     benzonatate (TESSALON) 200 MG capsule, Take 1 capsule (200 mg) by mouth 3 times daily as needed for cough., Disp: 30 capsule, Rfl: 0    omeprazole (PRILOSEC) 20 MG DR capsule, Take 1 capsule (20 mg) by mouth daily., Disp: 30 capsule, Rfl: 0    buPROPion (WELLBUTRIN XL) 150 MG 24 hr tablet, , Disp: , Rfl:     hydrOXYzine HCl (ATARAX) 25 MG tablet, Take 25 mg by mouth 2 times daily as needed for anxiety, Disp: , Rfl:     melatonin 5 MG tablet, Take 5 mg by mouth nightly as needed, Disp: , Rfl:     traZODone (DESYREL) 100 MG tablet, Take 100 mg by mouth at bedtime, Disp: , Rfl:     traZODone (DESYREL) 50 MG tablet, Take 25 mg by mouth at bedtime, Disp: , Rfl:     venlafaxine (EFFEXOR XR) 150 MG 24 hr capsule, Take 150 mg by mouth daily, Disp: , Rfl:     ALLERGIES:  No Known Allergies    FAMILY HISTORY:  Family History   Problem Relation Age of Onset    Anxiety Disorder Mother      Atrial fibrillation Mother     Depression Father     Mental Illness Father     Asthma Father     No Known Problems Sister     No Known Problems Son     Nba Parkinson White syndrome Son     No Known Problems Son        SOCIAL HISTORY:   Social History     Socioeconomic History    Marital status:    Occupational History    Occupation: [employer]    Tobacco Use    Smoking status: Never     Passive exposure: Never    Smokeless tobacco: Never   Vaping Use    Vaping status: Never Used   Substance and Sexual Activity    Alcohol use: Not Currently     Comment: drinks approx. 300-700 ml vodka daily    Drug use: Never    Sexual activity: Yes     Partners: Female     Birth control/protection: Condom   Other Topics Concern    Parent/sibling w/ CABG, MI or angioplasty before 65F 55M? No     Social Drivers of Health     Financial Resource Strain: Low Risk  (6/26/2024)    Financial Resource Strain     Within the past 12 months, have you or your family members you live with been unable to get utilities (heat, electricity) when it was really needed?: No   Food Insecurity: Low Risk  (6/26/2024)    Food Insecurity     Within the past 12 months, did you worry that your food would run out before you got money to buy more?: No     Within the past 12 months, did the food you bought just not last and you didn t have money to get more?: No   Transportation Needs: Low Risk  (6/26/2024)    Transportation Needs     Within the past 12 months, has lack of transportation kept you from medical appointments, getting your medicines, non-medical meetings or appointments, work, or from getting things that you need?: No   Physical Activity: Inactive (6/26/2024)    Exercise Vital Sign     Days of Exercise per Week: 0 days     Minutes of Exercise per Session: 0 min   Stress: Stress Concern Present (6/26/2024)    [employer] - Occupational Stress Questionnaire     Feeling of Stress : Very much   Social  "Connections: Unknown (6/26/2024)    Social Connection and Isolation Panel [NHANES]     Frequency of Social Gatherings with Friends and Family: Never   Interpersonal Safety: Low Risk  (7/1/2024)    Interpersonal Safety     Do you feel physically and emotionally safe where you currently live?: Yes     Within the past 12 months, have you been hit, slapped, kicked or otherwise physically hurt by someone?: No     Within the past 12 months, have you been humiliated or emotionally abused in other ways by your partner or ex-partner?: No   Housing Stability: Low Risk  (6/26/2024)    Housing Stability     Do you have housing? : Yes     Are you worried about losing your housing?: No       PHYSICAL EXAM:    Vitals: BP (!) 136/94   Pulse 93   Temp 98.4  F (36.9  C) (Oral)   Resp 16   Ht 1.854 m (6' 1\")   Wt 104.3 kg (230 lb)   SpO2 95%   BMI 30.34 kg/m     General:. Alert and interactive, comfortable appearing.  HENT: Oropharynx without erythema or exudates. MMM.  mild rhinorrhea  TMs clear bilaterally.  Eyes: Pupils mid-sized and equally reactive.   Neck: Full AROM.  No midline tenderness to palpation.  Cardiovascular: Regular rate and rhythm. Peripheral pulses 2+ bilaterally.  Chest/Pulmonary: Normal work of breathing. Lung sounds clear and equal throughout, no wheezes or crackles. No chest wall tenderness or deformities.  Abdomen: Soft, nondistended. Nontender without guarding or rebound.  Back/Spine: No CVA or midline tenderness.  Extremities: Normal ROM of all major joints. No lower extremity edema.   Skin: Warm and dry. Normal skin color.   Neuro: Speech clear. CNs grossly intact. Moves all extremities appropriately. Strength and sensation grossly intact to all extremities.   Psych: Normal affect/mood, cooperative, memory appropriate.     LAB:  All pertinent labs reviewed and interpreted.  Labs Ordered and Resulted from Time of ED Arrival to Time of ED Departure   INFLUENZA A/B, RSV AND SARS-COV2 PCR - Normal       " Result Value    Influenza A PCR Negative      Influenza B PCR Negative      RSV PCR Negative      SARS CoV2 PCR Negative         RADIOLOGY:  Chest XR,  PA & LAT   Final Result   IMPRESSION: Heart size is normal. No evidence of pneumonia or pulmonary edema. No visible pneumothorax or pleural effusion.          EKG:  See MDM    PROCEDURES:   None    I, Santosh Gonzalez, am serving as a scribe to document services personally performed by Dr. Deneen Carrillo  based on my observation and the provider's statements to me. I, Deneen Carrillo MD attest that Santosh Gonzalez is acting in a scribe capacity, has observed my performance of the services and has documented them in accordance with my direction.      Deneen Carrillo M.D.  Emergency Medicine  [facility] EMERGENCY ROOM  [address] [facility] [address]  [phone]  Dept: [phone]      Deneen Carrillo MD  04/19/25 0513

## 2025-04-19 NOTE — ED TRIAGE NOTES
Patient presents to the ED complaining of a cough for the past two weeks.  He feels short of breath upon exertion.  He is also complaining of acid reflux.  No medication prior to arrival.     Triage Assessment (Adult)       Row Name 04/19/25 0137          Triage Assessment    Airway WDL WDL        Respiratory WDL    Respiratory WDL X;cough;rhythm/pattern     Rhythm/Pattern, Respiratory dyspnea upon exertion     Cough Frequency frequent     Cough Type nonproductive;dry        Skin Circulation/Temperature WDL    Skin Circulation/Temperature WDL WDL        Cardiac WDL    Cardiac WDL WDL        Peripheral/Neurovascular WDL    Peripheral Neurovascular WDL WDL        Cognitive/Neuro/Behavioral WDL    Cognitive/Neuro/Behavioral WDL WDL

## 2025-06-02 ENCOUNTER — PATIENT OUTREACH (OUTPATIENT)
Dept: CARE COORDINATION | Facility: CLINIC | Age: 55
End: 2025-06-02
Payer: COMMERCIAL

## 2025-06-16 ENCOUNTER — PATIENT OUTREACH (OUTPATIENT)
Dept: CARE COORDINATION | Facility: CLINIC | Age: 55
End: 2025-06-16
Payer: COMMERCIAL

## 2025-08-02 ENCOUNTER — HEALTH MAINTENANCE LETTER (OUTPATIENT)
Age: 55
End: 2025-08-02